# Patient Record
Sex: FEMALE | Race: WHITE | Employment: FULL TIME | URBAN - METROPOLITAN AREA
[De-identification: names, ages, dates, MRNs, and addresses within clinical notes are randomized per-mention and may not be internally consistent; named-entity substitution may affect disease eponyms.]

---

## 2020-01-20 ENCOUNTER — HOSPITAL ENCOUNTER (OUTPATIENT)
Dept: PHYSICAL THERAPY | Age: 65
Discharge: HOME | End: 2020-01-20
Payer: COMMERCIAL

## 2020-01-20 PROCEDURE — 97165 OT EVAL LOW COMPLEX 30 MIN: CPT

## 2020-01-20 PROCEDURE — 97140 MANUAL THERAPY 1/> REGIONS: CPT

## 2020-01-20 NOTE — THERAPY EVALUATION
Shena Christopher  : 1955  Primary: [address] [employer]*  Secondary:  [address] at [facility]  [address], [place], [address]  [phone]   [phone]              OUTPATIENT OCCUPATIONAL THERAPY: Initial Assessment and Daily Note 2020    ICD-10: Treatment Diagnosis: I89.0 lymphedema, not elsewhere specified                                                      I89.022 lymphedema with obesity  Precautions/Allergies:   Patient has no allergy information on record. Fall Risk Score:    Ambulatory/Rehab Services H2 Model Falls Risk Assessment    Risk Factors:       No Risk Factors Identified Ability to Rise from Chair:       (0)  Ability to rise in a single movement    Falls Prevention Plan:       No modifications necessary   Total: (5 or greater = High Risk): 0     [facility] of [employer]. All Rights Reserved. [facility] [employer] Patent #[id number]. Federal Law prohibits the replication, distribution or use without written permission from [facility] [employer]     MD Orders: eval and treat MEDICAL/REFERRING DIAGNOSIS:   Lymphedema, not elsewhere classified [I89.0]   DATE OF ONSET: longstanding   REFERRING PHYSICIAN: Christopher Em*  RETURN PHYSICIAN APPOINTMENT: to be determined      INITIAL ASSESSMENT:  Ms. Yris Alonso was referred to occupational therapy for lymphedema treatment of the BLE's. 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 In 2016 she had gastric bypass surgery and has successfully lost 90 pounds. She continues to slowly lose weight. She is here today to update her HEP for self management of lymphedema and fitting for new compression garments. Patient presents today with  tissue that is soft and pitting in BUE\"s and BLE's - patient's body type is indicative of lipedema with large upper arms, large hips/small ankles and shelf buttocks included.  To date she is not wearing compression garments or self managing her lymphedema through exercise or compression. She will benefit from lymphedema treatment to be fitted for UE/LE compression garments and establishing a home program for self management of lymphedema. PLAN OF CARE:   PROBLEM LIST:  1. Edema/Girth  2. Decreased [place] with Home Exercise Program  3. limb heaviness INTERVENTIONS PLANNED  1. Skin care  2. Compression bandaging prn  3. Fitting for compression garment(s)  4. Manual therapy/Manual lymph drainage  5. Therapeutic exercise/Therapeutic activities  6. Patient Education  7. Compression pump trial prn  8.  kinesiotaping prn   TREATMENT PLAN:  Effective Dates: 1/20/2020 TO 4/19/2020. Frequency/Duration: 1 time a week for 90 days and upon reassessment will adjust frequency and duration as progress indicates. GOALS: (Goals have been discussed and agreed upon with patient.)  Short-Term Functional Goals: Time Frame: 45 days  1. The patient/caregiver will verbalize understanding of lymphedema precautions. 2. Patient will be independent with skin care regimen to decrease risk of cellulitis. 3. The patient/caregiver will be independent with self-manual lymph drainage techniques and show decrease in limb volume. 5. Patient will be independent in lymphatic exercises. Discharge Goals: Time Frame: 90 days  1. Patient's bilateral upper/lower extremity circumferential measurements will decrease on volumetric graph by 3-5cm to maximize functional use in ADL's.    2. The patient/caregiver will be independent with home management of lymphedema. 3. Patient/caregiver will be independent donning and doffing bilateral upper/lower extremity compression garment. Rehabilitation Potential For Stated Goals: Excellent  Regarding Dawna Hammond's therapy, I certify that the treatment plan above will be carried out by a therapist or under their direction.   Thank you for this referral,  Fernando Leal, OT Referring Physician Signature: Jeimy Em* _________________________  Date _________            The information in this section was collected on 1/20/2020 (except where otherwise noted). OCCUPATIONAL PROFILE & HISTORY:   History of Present Injury/Illness (Reason for Referral):  Patient was referred to occupational therapy for lymphedema treatment of the BLE's. 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 In 2016 she had gastric bypass surgery and has successfully lost 90 pounds. She continues to slowly lose weight. She is here today to update her HEP for self management of lymphedema and fitting for new compression garments. Past Medical History/Comorbidities:   Lymphedema, High cholesterol, gastric bypass 2016, gall bladder removal 2006  Social History/Living Environment:    Patient lives in a 2 story home. Her parents live with patient as she is their caretaker. Prior Level of Function/Work/Activity:   at [employer]  Dominant Side:         RIGHT  Previous Treatment Approaches:          Lymphedema treatment at this facility in 2016. Patient had gastric bypass surgery in 2016 and has lost 90 lbs since her surgery. She continues to lose weight slowly. Current Medications:  Statins 40mg           Date Last Reviewed:  1/20/2020   Complexity Level : Brief history (0):  LOW COMPLEXITY   ASSESSMENT OF OCCUPATIONAL PERFORMANCE:   Palpation:          tissue is soft and pitting in BUE\"s and BLE's - patient's body type is indicative of lipedema with large upper arms, large hips/small ankles and shelf buttocks included. ROM:          WFL BUE's/BLE's    Skin Integrity:          Intact but patient has excess skin at abdomen, upper arms and LE's secondary to gastric bypass surgery (lost over 90lbs).   Multi varicosities are noted on BLE's  Sensation:  intact  Functional Mobility:  independent  Activities of Daily Living: independent Edema/Girth:  pitting   PRETREATMENT AFFECTED LIMB(s): right lower extremity  left lower extremity      Date:  1/20/2020         Right / Left           Axilla   [x]      [x] 39/39cm          3 inches   [x]      [x] 39/39cm          Elbow   [x]      [x] 28/26cm          6 inches   [x]      [x] 27/27cm          3 inches   [x]      [x] 20/20cm          Wrist   [x]      [x] 16/16cm          Palm   []      []         Total limb size   [x]      [x] 169/167cm        Measurements are taken in centimeters:  2.54 cm = 1 inch         PRETREATMENT AFFECTED LIMB(s): right lower extremity  left lower extremity      Date:  1/20/2020         Right / Left           Groin   []      []           8 inches   [x]      [x] 67/68cm          4 inches   [x]      [x] 60/55cm        PoplitealSpace   []      []           8 inches   [x]      [x] 51/52cm          4 inches   []      []           Ankle   [x]      [x] 26/27cm          Instep   []      []         Measurements are taken in centimeters:  2.54 cm = 1 inch    RLE total limb size 204xm        LLE total limb size 204cm               Physical Skills Involved:  1. Edema  2. limb heaviness Cognitive Skills Affected (resulting in the inability to perform in a timely and safe manner):  1. Psychosocial Skills Affected:  1. Habits/Routines   Number of elements that affect the Plan of Care: 1-3:  LOW COMPLEXITY   CLINICAL DECISION MAKING:   Outcome Measure: Tool Used: Tool Used: Lymphedema Life Impact Scale   Score:  Initial: 9 Most Recent: X (Date: -- )   Interpretation of Score: The Lymphedema Life Impact Scale (LLIS) is a validated instrument that measures the physical, functional, and psychosocial concerns pertinent to patients with extremity lymphedema. The Scale's questionnaire is administered to patients to gauge impairments, activity limitations, and participation restrictions resulting from their lymphedema.   Score 0 1-13 14-26 27-40 41-54 55-67 68   Modifier CH CI CJ CK CL CM CN ? Other PT/OT Primary Functional Limitations:     - CURRENT STATUS: CI - 1%-19% impaired, limited or restricted    - GOAL STATUS: CI - 1%-19% impaired, limited or restricted    - D/C STATUS:  ---------------To be determined---------------       Medical Necessity:   · Skilled intervention continues to be required due to BLE lymphedema. Reason for Services/Other Comments:  · Patient continues to require skilled intervention due to patient's inability to effectively self manage BUE/BLE lymphedema. Use of outcome tool(s) and clinical judgement create a POC that gives a: Clear prediction of patient's progress: LOW COMPLEXITY   TREATMENT:   (In addition to Assessment/Re-Assessment sessions the following treatments were rendered)    Pre-treatment Symptoms/Complaints:  BUE/BLE lymphedema  Pain: Initial:   Pain Intensity 1: 0  Post Session:  0/10   Occupational Therapy Treatments:    OT eval( x ) OT eval was completed on 1/20/2020. Pt received information on lymphedema and risk reduction/self management practices as outlined by the National Lymphedema Network. Therapeutic Exercise ( minutes):     HEP:  As above; handouts given to patient for all exercises. Manual Therapy: (45 minutes): patient education for pathophysiology of the lymphatic system and review of self management principles for lipo-lymphedema. At present patient is not wearing any compression and reports her LE's increase in size as the day progresses - this does fluctuate though. Since her bypass surgery she has excess loose skin that can contribute to her fluctuations in limb size. Self management principles for lymphedema were reviewed to include: daily skin care, self MLD and exercise to aid in promoting lymphatic flow. Patient will consider joining the gym program at this facility to allow her to perform water exercises on a consistent basis. See compression section below.           Manual Lymph Drainage:          Lymph Nodes:    Cervical Supraclavicular Axillary Abdominal Inguinal Popliteal Antecubital   RIGHT []     []     []     []     []     []     []       LEFT []     []     []     []     []     []     []          Anastamoses:   Axillo-axillary Inguino-inguinal Axillo-inguinal Inguino-axillary   ANTERIOR []     []     []     []       POSTERIOR []     []     []     []       RIGHT []     []     []     []       LEFT []     []     []     []         Limbs:   []    RUE     []    LUE     []    RLE    []    LLE   Compression: Patient was measured today for a Solidea bilateral arm sleeve (medium, cream) and 15-20mmHg thigh high compression stockings (Juzo soft, sive 4, short, open toe, black). Order was placed through [facility] and will be shipped directly to patient's home. She will wear for day compression once garments are received. Treatment/Session Assessment:    · Response to Treatment:  Patient tolerated assessment/treatment without complication. She agrees with treatment plan established. Once she receives her garments she will return to therapy to update her HEP for lymphedema. · Compliance with Program/Exercises: Will assess as treatment progresses. · Recommendations/Intent for next treatment session: \"Next visit will focus on lymphedema treatment protocol and patient education for self management principles for lymphedema. \".   Total Treatment Duration:  OT Patient Time In/Time Out  Time In: 1100  Time Out: [address], OT

## 2020-05-18 NOTE — THERAPY DISCHARGE
Sherren Arthur  : 1955  Primary: [place]  Secondary:  [facility] at [facility]  [address], [facility] [address]  [phone]   [phone]              OUTPATIENT OCCUPATIONAL THERAPY:Discontinuation Note 2020    ICD-10: Treatment Diagnosis: I89.0 lymphedema, not elsewhere specified                                                      I89.022 lymphedema with obesity  Precautions/Allergies:   Patient has no allergy information on record. Fall Risk Score:    Ambulatory/Rehab Services H2 Model Falls Risk Assessment    Risk Factors:       No Risk Factors Identified Ability to Rise from Chair:       (0)  Ability to rise in a single movement    Falls Prevention Plan:       No modifications necessary   Total: (5 or greater = High Risk): 0     [facility] of [facility]. All Rights Reserved. [facility] Patent #[id number]. Federal Law prohibits the replication, distribution or use without written permission from [facility] of [address] [place] [address]     MD Orders: eval and treat MEDICAL/REFERRING DIAGNOSIS:   Lymphedema, not elsewhere classified [I89.0]   DATE OF ONSET: longstanding   REFERRING PHYSICIAN: Mikhail Em*  RETURN PHYSICIAN APPOINTMENT: to be determined      INITIAL ASSESSMENT:  Ms. Jesse Epps was referred to occupational therapy for lymphedema treatment of the BLE's. 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 In 2016 she had gastric bypass surgery and has successfully lost 90 pounds. She continues to slowly lose weight. She is here today to update her HEP for self management of lymphedema and fitting for new compression garments. Patient presents today with  tissue that is soft and pitting in BUE\"s and BLE's - patient's body type is indicative of lipedema with large upper arms, large hips/small ankles and shelf buttocks included.  To date she is not wearing compression garments or self managing her lymphedema through exercise or compression. She will benefit from lymphedema treatment to be fitted for UE/LE compression garments and establishing a home program for self management of lymphedema. DISCONTINUATION (5/18/2020): Patient is discontinued from therapy. She did not return after her evaluation. PLAN OF CARE:   PROBLEM LIST:  1. Edema/Girth  2. Decreased [place] with Home Exercise Program  3. limb heaviness INTERVENTIONS PLANNED  1. Skin care  2. Compression bandaging prn  3. Fitting for compression garment(s)  4. Manual therapy/Manual lymph drainage  5. Therapeutic exercise/Therapeutic activities  6. Patient Education  7. Compression pump trial prn  8.  kinesiotaping prn   TREATMENT PLAN:  Effective Dates: 1/20/2020 TO 4/19/2020. Frequency/Duration: 1 time a week for 90 days and upon reassessment will adjust frequency and duration as progress indicates. GOALS: (Goals have been discussed and agreed upon with patient.)  Short-Term Functional Goals: Time Frame: 45 days Goals not met  1. The patient/caregiver will verbalize understanding of lymphedema precautions. 2. Patient will be independent with skin care regimen to decrease risk of cellulitis. 3. The patient/caregiver will be independent with self-manual lymph drainage techniques and show decrease in limb volume. 5. Patient will be independent in lymphatic exercises. Discharge Goals: Time Frame: 90 days Goals not met  1. Patient's bilateral upper/lower extremity circumferential measurements will decrease on volumetric graph by 3-5cm to maximize functional use in ADL's.    2. The patient/caregiver will be independent with home management of lymphedema. 3. Patient/caregiver will be independent donning and doffing bilateral upper/lower extremity compression garment.     Rehabilitation Potential For Stated Goals: Excellent  Regarding Jane Hammond's therapy, I certify that the treatment plan above will be carried out by a therapist or under their direction. Thank you for this referral,  Jame Brown, OT     Referring Physician Signature: Ptarice Em* _________________________  Date _________            The information in this section was collected on 1/20/2020 (except where otherwise noted).

## 2022-06-16 ENCOUNTER — OFFICE VISIT (OUTPATIENT)
Dept: ORTHOPEDIC SURGERY | Age: 67
End: 2022-06-16
Payer: MEDICARE

## 2022-06-16 VITALS — BODY MASS INDEX: 39.64 KG/M2 | HEIGHT: 64 IN | WEIGHT: 232.2 LBS

## 2022-06-16 DIAGNOSIS — M25.561 RIGHT KNEE PAIN, UNSPECIFIED CHRONICITY: Primary | ICD-10-CM

## 2022-06-16 DIAGNOSIS — M17.0 PRIMARY OSTEOARTHRITIS OF BOTH KNEES: ICD-10-CM

## 2022-06-16 PROCEDURE — G8417 CALC BMI ABV UP PARAM F/U: HCPCS | Performed by: ORTHOPAEDIC SURGERY

## 2022-06-16 PROCEDURE — G8400 PT W/DXA NO RESULTS DOC: HCPCS | Performed by: ORTHOPAEDIC SURGERY

## 2022-06-16 PROCEDURE — 4004F PT TOBACCO SCREEN RCVD TLK: CPT | Performed by: ORTHOPAEDIC SURGERY

## 2022-06-16 PROCEDURE — 1090F PRES/ABSN URINE INCON ASSESS: CPT | Performed by: ORTHOPAEDIC SURGERY

## 2022-06-16 PROCEDURE — 3017F COLORECTAL CA SCREEN DOC REV: CPT | Performed by: ORTHOPAEDIC SURGERY

## 2022-06-16 PROCEDURE — G8427 DOCREV CUR MEDS BY ELIG CLIN: HCPCS | Performed by: ORTHOPAEDIC SURGERY

## 2022-06-16 PROCEDURE — 20610 DRAIN/INJ JOINT/BURSA W/O US: CPT | Performed by: ORTHOPAEDIC SURGERY

## 2022-06-16 PROCEDURE — 1123F ACP DISCUSS/DSCN MKR DOCD: CPT | Performed by: ORTHOPAEDIC SURGERY

## 2022-06-16 PROCEDURE — 99204 OFFICE O/P NEW MOD 45 MIN: CPT | Performed by: ORTHOPAEDIC SURGERY

## 2022-06-16 RX ORDER — LISINOPRIL AND HYDROCHLOROTHIAZIDE 12.5; 1 MG/1; MG/1
TABLET ORAL
COMMUNITY
Start: 2022-05-03

## 2022-06-16 RX ORDER — ROSUVASTATIN CALCIUM 10 MG/1
TABLET, COATED ORAL
COMMUNITY
Start: 2022-05-03

## 2022-06-16 RX ORDER — METHYLPREDNISOLONE ACETATE 40 MG/ML
40 INJECTION, SUSPENSION INTRA-ARTICULAR; INTRALESIONAL; INTRAMUSCULAR; SOFT TISSUE ONCE
Status: COMPLETED | OUTPATIENT
Start: 2022-06-16 | End: 2022-06-16

## 2022-06-16 RX ADMIN — METHYLPREDNISOLONE ACETATE 40 MG: 40 INJECTION, SUSPENSION INTRA-ARTICULAR; INTRALESIONAL; INTRAMUSCULAR; SOFT TISSUE at 15:19

## 2022-06-16 NOTE — PROGRESS NOTES
Patient ID:  Rico Sidhu  [id number]  22 y.o.  1955    Today: June 16, 2022          Chief Complaint:  Bilateral Knee pain    HPI:       Rico Sidhu is a 78 y.o. female  that presents today for evaluation and treatment of bilateral knee pain. Patient reports onset of pain was some time ago and has recently become more significant. The patient complains of knee pain with activities, reports stiffness of the knee with prolonged inactivity, and swelling/pain at the end of the day and after increased physical activity. Pain is described as a general ache with occasional sharp pain, primarily along the joint lines. The patient reports pain ranging from 3-8 on the pain scale which seems to fluctuate depending on the day/activity. Generally, symptoms improve with sitting/rest.  The pain affects the patients activities of daily living and quality of life. Patient reports progressive pain and instability in the knee. Patient has attempted prior conservative treatment including Over-the-Counter medications including NSAID and/or Tylenol and Activity Modifications    Past Medical History:  No past medical history on file. Past Surgical History:  No past surgical history on file. Medications:     Prior to Admission medications    Medication Sig Start Date End Date Taking? Authorizing Provider   lisinopril-hydroCHLOROthiazide (PRINZIDE;ZESTORETIC) 10-12.5 MG per tablet  5/3/22   Historical Provider, MD   rosuvastatin (CRESTOR) 10 MG tablet  5/3/22   Historical Provider, MD       Family History:   No family history on file. Social History:      Social History     Tobacco Use    Smoking status: Not on file    Smokeless tobacco: Not on file   Substance Use Topics    Alcohol use: Not on file         Allergies:       Allergies   Allergen Reactions    Codeine      Other reaction(s): Nausea and/or vomiting-Intolerance  Passing out, tramadol ok        Vitals:   Ht 5' 4\" (1.626 m)   Wt 232 lb 3.2 oz (105.3 kg)   BMI 39.86 kg/m²     ROS:    ROS has been reviewed and pertinent positives and negative pertaining to orthopaedic complaints are noted. All non-orthopaedic issues are deferred to PCP      Objective:     Vitals:   Ht 5' 4\" (1.626 m)   Wt 232 lb 3.2 oz (105.3 kg)   BMI 39.86 kg/m²     General: Awake and alert. AAOx3. Psych: Mood appropriate  HEENT: Normocephalic, atraumatic  Neck: Supple  CV/Pulm: Breathing even and unlabored  Abdomen: Nondistended  Circulation: Normal without obvious arterial or venous deficiency. Pulses palpable bilateral lower extremities. Lymphatic: No obvious lymphedema or lymphadenopathy noted. Skin: No obvious lacerations or rashes noted. Musculoskeletal: No obvious deformity or pain with active movement of the upper extremities. Neuro: No obvious deficiency or weakness noted of the upper or lower extremities    Knee Exam:  There is pain with ROM of both knees. Range of motion on the right is 0-130. Range of motion on the left is 0-130. Trace effusion noted in the knees. Patellofemoral crepitus is present. Tenderness along the joint line both at rest and during motion. There is no significant ligamentous laxity. Distally the patient shows no neurologic deficit. Sensation is grossly intact. The patient is able to grossly plantar- and dorsi-flex the involved foot/ankle. Imaging (obtained today or previously):    Heading: XR Knee 3-4 View  Views: AP knee, skiers PA knee, lateral knee, sunrise view bilateral knee  Clinical indication: Bilateral Knee Pain   Findings: Xrays of the knees obtained today or previously show tricompartmental bone-on-bone arthritic changes with associated osteophyte formation and subchondral sclerosis. Impression: Bilateral Knee osteoarthritis    Sasha Chairez MD    Assessment:   1. Bilateral Knee Arthritis    Plan:  Treatment option have been discussed with the patient.  The patient understands the nature of knee arthritis and that this is generally a progressive condition. We discussed oral anti-inflammatory medications, activity modifications, physical therapy, corticosteroid injections, weight loss (if appropriate), and surgery. We discussed that given the degenerative nature of the joint that in most cases surgery is the definitive treatment for this condition. We did discuss some of the details of surgery along with some of the risks, benefits and alternatives. At this point the patient is a candidate for surgery however they would like to try to postpone surgery at this point in time if possible. At this point the patient has failed the aforementioned treatment modalities and would like to proceed with Corticosteroid Injection    Treatment:    Steroid Injection - Risks, benefits, and alternatives of corticosteroid injection were discussed. After any questions were address the patient wished to proceed with injection. After prepping the injection site each knee was injected with 1cc of 40mg Depomedrol/3cc marcaine. Patient tolerated the procedure well. Patient is instructed to ice the joint for next few days if they experience discomfort.  The patient will followup as directed or as needed         Signed By: Bhargav Rao MD  June 16, 2022

## 2022-08-31 ENCOUNTER — TELEPHONE (OUTPATIENT)
Dept: ORTHOPEDIC SURGERY | Age: 67
End: 2022-08-31

## 2022-08-31 NOTE — TELEPHONE ENCOUNTER
Pt called and wants to have a left knee replacement. She wants to see Dr. Srini Ruvalcaba but saw Dr. Piedad Cortes for one appointment in June. She has friends that told her to see Dr. Srini Ruvalcaba. Will he see her?

## 2022-09-08 ENCOUNTER — OFFICE VISIT (OUTPATIENT)
Dept: ORTHOPEDIC SURGERY | Age: 67
End: 2022-09-08
Payer: MEDICARE

## 2022-09-08 DIAGNOSIS — M17.0 PRIMARY OSTEOARTHRITIS OF BOTH KNEES: Primary | ICD-10-CM

## 2022-09-08 PROCEDURE — 1090F PRES/ABSN URINE INCON ASSESS: CPT | Performed by: ORTHOPAEDIC SURGERY

## 2022-09-08 PROCEDURE — 20610 DRAIN/INJ JOINT/BURSA W/O US: CPT | Performed by: ORTHOPAEDIC SURGERY

## 2022-09-08 PROCEDURE — 99214 OFFICE O/P EST MOD 30 MIN: CPT | Performed by: ORTHOPAEDIC SURGERY

## 2022-09-08 PROCEDURE — 4004F PT TOBACCO SCREEN RCVD TLK: CPT | Performed by: ORTHOPAEDIC SURGERY

## 2022-09-08 PROCEDURE — G8400 PT W/DXA NO RESULTS DOC: HCPCS | Performed by: ORTHOPAEDIC SURGERY

## 2022-09-08 PROCEDURE — G8428 CUR MEDS NOT DOCUMENT: HCPCS | Performed by: ORTHOPAEDIC SURGERY

## 2022-09-08 PROCEDURE — G8417 CALC BMI ABV UP PARAM F/U: HCPCS | Performed by: ORTHOPAEDIC SURGERY

## 2022-09-08 PROCEDURE — 1123F ACP DISCUSS/DSCN MKR DOCD: CPT | Performed by: ORTHOPAEDIC SURGERY

## 2022-09-08 PROCEDURE — 3017F COLORECTAL CA SCREEN DOC REV: CPT | Performed by: ORTHOPAEDIC SURGERY

## 2022-09-08 RX ORDER — METHYLPREDNISOLONE ACETATE 40 MG/ML
40 INJECTION, SUSPENSION INTRA-ARTICULAR; INTRALESIONAL; INTRAMUSCULAR; SOFT TISSUE ONCE
Qty: 1 ML | Refills: 0
Start: 2022-09-08 | End: 2022-09-08

## 2022-09-08 NOTE — PROGRESS NOTES
Patient ID:  Carletta Kayser  [id number]  1 y.o.  1955    Today: September 8, 2022          Chief Complaint: Bilateral Knee pain    HPI:       Carletta Kayser is a 78 y.o. female seen for evaluation and treatment of their knee pain. Patient reports a longstanding history of pain involving the knee. The patient complains of knee pain with activities, reports pain as mostly occurring along the joint lines, reports stiffness of the knee with prolonged inactivity, and swelling/pain at the end of the day and after increased physical activity. The patient's knee pain cause moderate to severe limitations in the activities of rising from bed, putting on socks/shoes, rising from a sitting position, bending towards the floor and kneeling, twisting and pivoting on the limb and squatting. In addition, at times the patient reports extreme difficulty with these activities. Generally, symptoms improve with sitting/rest. The pain affects the patients activities of daily living and quality of life. Patient reports progressive pain and instability in the knee. The pain has been present for an extended period of time. Pain ranges from approximately 4-8 in a cyclical fashion with periods of acute exacerbation. Prior procedures on the knee include reconstruction. Patient has attempted prior conservative treatment including medications and activity modification. Treatment to date has included oral medications inclusive of over-the-counter medications (NSAIDS and/or Tylenol) +/-prescription medications, activity modification, weight loss (if applicable), and injections which include corticosteroids and/or viscosupplimentation. Past Medical History:  No past medical history on file. Past Surgical History:  No past surgical history on file. Medications:     Prior to Admission medications    Medication Sig Start Date End Date Taking?  Authorizing Provider   lisinopril-hydroCHLOROthiazide (PRINZIDE;ZESTORETIC) 10-12.5 MG per tablet  5/3/22   Historical Provider, MD   rosuvastatin (CRESTOR) 10 MG tablet  5/3/22   Historical Provider, MD       Family History:   No family history on file. Social History:      Social History     Tobacco Use    Smoking status: Not on file    Smokeless tobacco: Not on file   Substance Use Topics    Alcohol use: Not on file         Allergies: Allergies   Allergen Reactions    Codeine      Other reaction(s): Nausea and/or vomiting-Intolerance  Passing out, tramadol ok        Vitals: There were no vitals taken for this visit. ROS:   Review of Systems         Objective:   General: Patient is awake and in no acute distress  Psych: Mood and affect appropriate  HEENT: Normocephalic. Atramatic. Pupils equal, round and reactive. Sclera normal.   Neck: Supple without obvious mass   Chest: Symmetric  Lungs:  Breathing non-labored. No tachypnea noted. Abdomen: Soft on gross examination without obvious distention. Neuro: No obvious neurologic deficit. Grossly moves bilateral upper extremities without motor or sensory deficits. No gross weakness noted in the lower extremities. No hyporeflexia or hyperreflexia noted. Vascular: No gross arterial or venous deficiency noted. DP and PT pulses are palpable in the lower extremities  Lymphatic: No lymphedema noted in the lower extremities. Skin: No prior incisions noted about the right knee. No obvious rashes noted about the area. No skin changes noted about the knee or about the adjacent thigh or leg. Extremities:  Patient ambulates with an antalgic gait. There is pain with ROM of the right knee. Range of motion is 0-120. Trace effusion noted in the knee. Patellofemoral crepitus is present. Distally the patient shows no neurologic deficit.         Xrays (obtained either today or previously)    Heading: Knee Xrays  Views: AP knee, skiers PA, lateral knee, sunrise view bilateral knee  Clinical indication: Bilateral Knee Pain Findings: Xrays of the knees obtained today or previously show tricompartmental bone-on-bone arthritic changes of the bilateral knee with associated osteophyte formation and subchondral sclerosis. Impression: Bilateral Knee osteoarthritis    Luzma Johns MD      Assessment:   1. Arthritis of the Bilateral knee    Plan:   Differential diagnosis and treatment options have been discussed with the patient. Risks, benefits and alternatives of each were discussed and patient questions answered. The patient understands the nature of knee arthritis and that this is generally a progressive condition. At this point the patient has failed the aforementioned treatment modalities. At this point the patient would like to proceed with Total Knee Replacement. TREATMENT:    Total Knee Replacement- The patient has failed previous conservative treatment for this condition including anti-inflammatories, injections and lifestyle modifications. The necessity for joint replacement is present. Risks, benefits, alternatives and possible complications of left knee arthroplasty were discussed with the patient including but not limited to potential for infection, bleeding, damage to nerves and/or blood vessels, stiffness of the knee after surgery, knee instability after surgery, patellar maltracking, potential for fracture both intra-op and post-op, polyethylene wear, implant loosening, and risk for revision surgery secondary to but not limited to these discussed risks. Further, we have discussed potential for venous thrombo-embolism including deep vein thrombosis and pulmonary embolism despite being on prophylaxis. Additionally, we have discussed potential for continued pain after surgery and patient has voiced understanding that I can make no guarantees regarding the pain relief of outcomes after surgery.  We have also previously discussed the potential of morbidity and mortality associated with, but not limited to, the actual surgical procedure, anesthesia, prior medical conditions, and/or the administration of medications perioperatively. The patient has been given the opportunity to ask questions all of which have been answered and the patient wishes to proceed. Also patient would like to proceed with knee injection today. Risks, benefits, and alternatives of corticosteroid injection were discussed. We discussed potential risks of steroid injection including but not limited to steroid flare with an associated increase in pain, fat necrosis, skin discoloration around the injection site, temporary increase in blood sugars in diabetic patients and possible facial flushing after injection. After any questions were address the patient wished to proceed with injection. After prepping the injection site and bilateral knee was injected with 1cc Depomedrol/3cc marcaine. Patient tolerated the procedure well. Patient is instructed to ice the joint for next few days if they experience discomfort.           Signed By: Ava Cervantes MD  September 8, 2022

## 2022-09-23 DIAGNOSIS — M17.12 PRIMARY OSTEOARTHRITIS OF LEFT KNEE: Primary | ICD-10-CM

## 2022-09-23 DIAGNOSIS — M21.962 ACQUIRED DEFORMITY OF LEFT KNEE: ICD-10-CM

## 2022-10-04 DIAGNOSIS — M21.962 ACQUIRED DEFORMITY OF LEFT KNEE: ICD-10-CM

## 2022-10-04 DIAGNOSIS — M17.12 PRIMARY OSTEOARTHRITIS OF LEFT KNEE: ICD-10-CM

## 2022-11-21 ENCOUNTER — PREP FOR PROCEDURE (OUTPATIENT)
Dept: ORTHOPEDIC SURGERY | Age: 67
End: 2022-11-21

## 2022-11-21 DIAGNOSIS — M17.12 PRIMARY OSTEOARTHRITIS OF LEFT KNEE: Primary | ICD-10-CM

## 2022-11-21 RX ORDER — SODIUM CHLORIDE 9 MG/ML
INJECTION, SOLUTION INTRAVENOUS PRN
OUTPATIENT
Start: 2022-11-21

## 2022-11-21 RX ORDER — SODIUM CHLORIDE 0.9 % (FLUSH) 0.9 %
5-40 SYRINGE (ML) INJECTION EVERY 12 HOURS SCHEDULED
OUTPATIENT
Start: 2022-11-21

## 2022-11-21 RX ORDER — SODIUM CHLORIDE 0.9 % (FLUSH) 0.9 %
5-40 SYRINGE (ML) INJECTION PRN
OUTPATIENT
Start: 2022-11-21

## 2022-11-23 ENCOUNTER — TELEPHONE (OUTPATIENT)
Dept: ORTHOPEDIC SURGERY | Age: 67
End: 2022-11-23

## 2022-11-23 NOTE — TELEPHONE ENCOUNTER
I called and spoke to patient. She called about receiving an email on 11/21 from Immanuel Avendano about prep for procedure. I told her that is just ashish putting information in for her joint camp and not an appointment that she should've attended on 11/21.

## 2022-11-28 ENCOUNTER — HOSPITAL ENCOUNTER (OUTPATIENT)
Dept: REHABILITATION | Age: 67
Discharge: HOME | End: 2022-12-01
Payer: MEDICARE

## 2022-11-28 ENCOUNTER — HOSPITAL ENCOUNTER (OUTPATIENT)
Dept: SURGERY | Age: 67
Discharge: HOME | End: 2022-12-01
Payer: MEDICARE

## 2022-11-28 VITALS
HEIGHT: 64 IN | RESPIRATION RATE: 18 BRPM | OXYGEN SATURATION: 97 % | WEIGHT: 224 LBS | HEART RATE: 83 BPM | TEMPERATURE: 98 F | DIASTOLIC BLOOD PRESSURE: 69 MMHG | SYSTOLIC BLOOD PRESSURE: 155 MMHG | BODY MASS INDEX: 38.24 KG/M2

## 2022-11-28 DIAGNOSIS — M17.12 PRIMARY OSTEOARTHRITIS OF LEFT KNEE: ICD-10-CM

## 2022-11-28 DIAGNOSIS — Z91.14 NONCOMPLIANCE WITH CPAP TREATMENT: ICD-10-CM

## 2022-11-28 LAB
ALBUMIN SERPL-MCNC: 3.7 G/DL (ref 3.2–4.6)
ANION GAP SERPL CALC-SCNC: 5 MMOL/L (ref 2–11)
APTT PPP: 32 SEC (ref 24.5–34.2)
BACTERIA SPEC CULT: NORMAL
BASOPHILS # BLD: 0 K/UL (ref 0–0.2)
BASOPHILS NFR BLD: 1 % (ref 0–2)
BUN SERPL-MCNC: 17 MG/DL (ref 8–23)
CALCIUM SERPL-MCNC: 10.1 MG/DL (ref 8.3–10.4)
CHLORIDE SERPL-SCNC: 103 MMOL/L (ref 101–110)
CO2 SERPL-SCNC: 32 MMOL/L (ref 21–32)
CREAT SERPL-MCNC: 0.68 MG/DL (ref 0.6–1)
DIFFERENTIAL METHOD BLD: ABNORMAL
EKG ATRIAL RATE: 85 BPM
EKG DIAGNOSIS: NORMAL
EKG P AXIS: 54 DEGREES
EKG P-R INTERVAL: 160 MS
EKG Q-T INTERVAL: 352 MS
EKG QRS DURATION: 74 MS
EKG QTC CALCULATION (BAZETT): 418 MS
EKG R AXIS: 63 DEGREES
EKG T AXIS: 61 DEGREES
EKG VENTRICULAR RATE: 85 BPM
EOSINOPHIL # BLD: 0.2 K/UL (ref 0–0.8)
EOSINOPHIL NFR BLD: 4 % (ref 0.5–7.8)
ERYTHROCYTE [DISTWIDTH] IN BLOOD BY AUTOMATED COUNT: 14.5 % (ref 11.9–14.6)
EST. AVERAGE GLUCOSE BLD GHB EST-MCNC: 117 MG/DL
GLUCOSE SERPL-MCNC: 95 MG/DL (ref 65–100)
HBA1C MFR BLD: 5.7 % (ref 4.8–5.6)
HCT VFR BLD AUTO: 46 % (ref 35.8–46.3)
HGB BLD-MCNC: 14.4 G/DL (ref 11.7–15.4)
IMM GRANULOCYTES # BLD AUTO: 0 K/UL (ref 0–0.5)
IMM GRANULOCYTES NFR BLD AUTO: 0 % (ref 0–5)
INR PPP: 1
LYMPHOCYTES # BLD: 1.7 K/UL (ref 0.5–4.6)
LYMPHOCYTES NFR BLD: 25 % (ref 13–44)
MCH RBC QN AUTO: 29.5 PG (ref 26.1–32.9)
MCHC RBC AUTO-ENTMCNC: 31.3 G/DL (ref 31.4–35)
MCV RBC AUTO: 94.3 FL (ref 82–102)
MONOCYTES # BLD: 0.4 K/UL (ref 0.1–1.3)
MONOCYTES NFR BLD: 7 % (ref 4–12)
NEUTS SEG # BLD: 4.1 K/UL (ref 1.7–8.2)
NEUTS SEG NFR BLD: 64 % (ref 43–78)
NRBC # BLD: 0 K/UL (ref 0–0.2)
PLATELET # BLD AUTO: 179 K/UL (ref 150–450)
PMV BLD AUTO: 10.8 FL (ref 9.4–12.3)
POTASSIUM SERPL-SCNC: 4.3 MMOL/L (ref 3.5–5.1)
PROTHROMBIN TIME: 13.6 SEC (ref 12.6–14.3)
RBC # BLD AUTO: 4.88 M/UL (ref 4.05–5.2)
SERVICE CMNT-IMP: NORMAL
SODIUM SERPL-SCNC: 140 MMOL/L (ref 133–143)
WBC # BLD AUTO: 6.5 K/UL (ref 4.3–11.1)

## 2022-11-28 PROCEDURE — 97161 PT EVAL LOW COMPLEX 20 MIN: CPT

## 2022-11-28 PROCEDURE — 85025 COMPLETE CBC W/AUTO DIFF WBC: CPT

## 2022-11-28 PROCEDURE — 83036 HEMOGLOBIN GLYCOSYLATED A1C: CPT

## 2022-11-28 PROCEDURE — 87641 MR-STAPH DNA AMP PROBE: CPT

## 2022-11-28 PROCEDURE — 82040 ASSAY OF SERUM ALBUMIN: CPT

## 2022-11-28 PROCEDURE — 80307 DRUG TEST PRSMV CHEM ANLYZR: CPT

## 2022-11-28 PROCEDURE — 94760 N-INVAS EAR/PLS OXIMETRY 1: CPT

## 2022-11-28 PROCEDURE — 85610 PROTHROMBIN TIME: CPT

## 2022-11-28 PROCEDURE — 93005 ELECTROCARDIOGRAM TRACING: CPT

## 2022-11-28 PROCEDURE — 85730 THROMBOPLASTIN TIME PARTIAL: CPT

## 2022-11-28 PROCEDURE — 80048 BASIC METABOLIC PNL TOTAL CA: CPT

## 2022-11-28 ASSESSMENT — PAIN DESCRIPTION - ORIENTATION: ORIENTATION: LEFT;RIGHT

## 2022-11-28 ASSESSMENT — KOOS JR
STRAIGHTENING KNEE FULLY: 2
GOING UP OR DOWN STAIRS: 2
HOW SEVERE IS YOUR KNEE STIFFNESS AFTER FIRST WAKING IN MORNING: 1
BENDING TO THE FLOOR TO PICK UP OBJECT: 1
STANDING UPRIGHT: 2
TWISING OR PIVOTING ON KNEE: 2
KOOS JR TOTAL INTERVAL SCORE: 57.14
RISING FROM SITTING: 2

## 2022-11-28 ASSESSMENT — PULMONARY FUNCTION TESTS
FEV1 (%PREDICTED): 76
FEV1 (LITERS): 1.83

## 2022-11-28 ASSESSMENT — PAIN DESCRIPTION - LOCATION: LOCATION: KNEE

## 2022-11-28 ASSESSMENT — PAIN SCALES - GENERAL: PAINLEVEL_OUTOF10: 7

## 2022-11-28 NOTE — PERIOP NOTE
The below lab results are within anesthesia limits.       Latest Reference Range & Units 11/28/22 12:30   Sodium 133 - 143 mmol/L 140   Potassium 3.5 - 5.1 mmol/L 4.3   Chloride 101 - 110 mmol/L 103   CO2 21 - 32 mmol/L 32   BUN,BUNPL 8 - 23 MG/DL 17   Creatinine 0.6 - 1.0 MG/DL 0.68   Anion Gap 2 - 11 mmol/L 5   Est, Glom Filt Rate >60 ml/min/1.73m2 >60   Glucose, Random 65 - 100 mg/dL 95   CALCIUM, SERUM, 554276 8.3 - 10.4 MG/DL 10.1   Albumin 3.2 - 4.6 g/dL 3.7   Hemoglobin A1C 4.8 - 5.6 % 5.7 (H)   eAG (mg/dL) mg/dL 117   WBC 4.3 - 11.1 K/uL 6.5   RBC 4.05 - 5.2 M/uL 4.88   Hemoglobin Quant 11.7 - 15.4 g/dL 14.4   Hematocrit 35.8 - 46.3 % 46.0   MCV 82.0 - 102.0 FL 94.3   MCH 26.1 - 32.9 PG 29.5   MCHC 31.4 - 35.0 g/dL 31.3 (L)   MPV 9.4 - 12.3 FL 10.8   RDW 11.9 - 14.6 % 14.5   Platelet Count 707 - 450 K/uL 179   Absolute Mono # 0.1 - 1.3 K/UL 0.4   Eosinophils % 0.5 - 7.8 % 4   Basophils Absolute 0.0 - 0.2 K/UL 0.0   Differential Type -   AUTOMATED   Seg Neutrophils 43 - 78 % 64   Segs Absolute 1.7 - 8.2 K/UL 4.1   Lymphocytes 13 - 44 % 25   Absolute Lymph # 0.5 - 4.6 K/UL 1.7   Monocytes 4.0 - 12.0 % 7   Absolute Eos # 0.0 - 0.8 K/UL 0.2   Basophils 0.0 - 2.0 % 1   Immature Granulocytes 0.0 - 5.0 % 0   Nucleated Red Blood Cells 0.0 - 0.2 K/uL 0.00   Absolute Immature Granulocyte 0.0 - 0.5 K/UL 0.0   Prothrombin Time 12.6 - 14.3 sec 13.6   INR -   1.0   PTT 24.5 - 34.2 SEC 32.0   MSSA/MRSA SCREEN BY PCR  Rpt   (H): Data is abnormally high  (L): Data is abnormally low  Rpt: View report in Results Review for more information

## 2022-11-28 NOTE — PROGRESS NOTES
11/28/22 1230   Treatment   Treatment Type Bedside spirometry   Oxygen Therapy/Pulse Ox   O2 Therapy Room air   Heart Rate 83   SpO2 97 %   Pulse Oximeter Device Mode Intermittent   $Pulse Oximeter $Spot check (single)   Bedside Spirometry   FEV-1/Actual (Liters) 1.83 Liters   FEV-1/Predicted (Liters) 76 Liters   Initial respiratory Assessment completed with pt. Pt was interviewed and evaluated in Joint camp prior to surgery. Patient ID:  Rufina Quesada  [id number]  24 y.o.  1955  Surgeon: Dr. Elvia Rodas  Date of Surgery: [email or web address]  Procedure: Total Left Knee Arthroplasty  Primary Care Physician: Yvette Leon [phone]  Specialists:     BS:DIMINISHED      Pt taught proper COUGH technique  IS REVIEWED WITH PT AS WELL AS BENEFITS OF USING IS IN SEDENTARY PTS. DIAPHRAGMATIC BREATHING EXERCISE INSTRUCTIONS GIVEN    History of smoking:   DENIES                 Quit date:         Secondhand smoke:DENIES    Past procedures with Oxygen desaturation or delayed awakening:DENIES    Past Medical History:   Diagnosis Date    High cholesterol     managed with medication    Hypertension     managed with medication    Obesity         Respiratory history:DENIES SOB                                                                   Respiratory meds:  DENIES    FAMILY PRESENT:             NO     PAST SLEEP STUDY:        YES        - COULD NOT RECALL WHERE STUDY WAS DONE            HX OF ARNAUD:                        YES             PT STATES SHE HAD WEIGHT LOSS AND DID NOT NEED CPAP ANYMORE BUT NEVER HAD FOLLOW UP   ARNAUD assessment:    . DANGERS OF UNTREATED ARNAUD EXPLAINED TO PT.                                          SLEEPS ON SIDE        PHYSICAL EXAM   Body mass index is 38.45 kg/m².    Vitals:    11/28/22 1230   BP:    Pulse: (P) 83   Resp:    Temp:    SpO2: (P) 97%     Neck circumference:  36    cm    Loud snoring:                                                      DENIES  Witnessed apnea or wakening gasping or choking:        DENIES         Awakens with headaches:                                               DENIES  Morning or daytime tiredness/ sleepiness:                      DENIES            Dry mouth or sore throat in morning:                      DENIES                                   Phillips stage:  4                                   SACS score:6  Stop Bang                                  Dangers of non-compliance with treatment of ARNAUD explained to pt. ARNAUD handouts given to pt. ALBUTEROL Q 6 PRN  CONT. SAT MONITOR HS after surgery. O2 @ 3 lpm NC HS   RESPIRATORY ASSESSMENT Q SHIFT.                                              Referrals:    Pt. Phone Number:

## 2022-11-28 NOTE — PROGRESS NOTES
[facility]  : 1955  Primary: Medicare [employer] B  Secondary: [address]  at [address] [address].  [phone]        Physical Therapy Prehab Evaluation Summary:2022   Time In/Out   PT Charge Capture  Episode     MEDICAL/REFERRING DIAGNOSIS: Unilateral primary osteoarthritis, left knee [M17.12]  REFERRING PHYSICIAN: Jt Disla MD    ICD-10: Treatment Diagnosis:   Pain in Left Knee (M25.562)  Stiffness of Left Knee, Not elsewhere classified (I80.000)    DATE OF SURGERY: 22  Assessment:   COMMENTS:  Scheduled for L TKA. Reports B knee pain L > R. Independent with gait and ADLs. PROBLEM LIST:   (Impacting functional limitations):  Ms. Dixie Bernheim presents with the following lower extremity(s) problems:  Gait  Strength  Range of Motion  Home Exercise Program  Pain INTERVENTIONS PLANNED:   (Benefits and precautions of physical therapy have been discussed with the patient.)  Home Exercise Program  Educational Discussion       GOALS: (Goals have been discussed and agreed upon with patient.)  Discharge Goals: Time Frame: 1 Day  Patient will demonstrate independence with a home exercise program designed to increase functional technique and pain control to minimize functional deficits and optimize patient for total joint replacement. Subjective:   Past Medical History/Comorbidities:   Ms. Dixie Bernheim  has a past medical history of High cholesterol, Hypertension, and Obesity. Ms. Dixie Bernheim  has a past surgical history that includes Knee arthroscopy (Bilateral); Tonsillectomy; Hysterectomy; Cholecystectomy; and Gastric bypass surgery.     Allergies:  Codeine    Current Medications:  Refer to pre-assessment nursing note    Home Set-Up/Prior Level of Function:  Lives With: Parent  Type of Home: House  Home Layout: Two level, Able to Live on Main level with bedroom/bathroom  Home Access: Stairs to enter without rails  Entrance Stairs - Number of Steps: 1  Bathroom Shower/Tub: Walk-in shower, Tub/Shower unit  Home Equipment:  (none)    Dominant Side:  Dominant Hand: : Right    Current Functional Status:   Ambulation:  [x] Independent  [] Walk Indoors Only  [] Walk Outdoors  [] Use Assistive Device  [] Use Wheelchair Only Dressing:  [x] Independent  Requires Assistance from Someone for:  [] Sock/Shoes  [] Pants  [] Everything   Bathing/Showering:   [x] Independent  [] Requires Assistance from Someone  [] Sponge Bath Only Household Activities:  [] Routine house and yard work  [x] Light Housework Only  [] None     Objective:   PAIN:   Pre-Treatment Pain  Pain Assessment: 0-10  Pain Level: 7  Pain Location: Knee  Pain Orientation: Left, Right    Post Treatment: 7    Outcome Measure:   HOOS-JR:       KOOS-JR:  KOOS, . Knee Survey Score: 12    LOWER EXTREMITY GROSS EVALUATION:  RIGHT LE   Within Functional Limits   Abnormal   Comments   Strength [] []     Range of Motion [] [] AROM RLE (degrees)  R Knee Flexion 0-145: 115  R Knee Extension 0: 0      LEFT LE Within Functional Limits   Abnormal   Comments   Strength [] [] Strength LLE  L Hip Flexion: 3+/5  L Knee Flexion: 4-/5  L Knee Extension: 4-/5   Range of Motion [] [] AROM LLE (degrees)  L Knee Flexion 0-145: 95  L Knee Extension 0: 0     UPPER EXTREMITY GROSS EVALUATION:     Within Functional Limits   Abnormal   Comments   Strength [] []    Range of Motion [] []      SENSATION  Sensation [x]       COGNITION/  PERCEPTION: Intact Impaired (Comments):   Orientation [x]     Vision [x]     Hearing [x]     Cognition  [x]       TRANSFERS: I Mod I S SBA CGA Min Mod Max Total  NT x2 Comments:   Sit to Stand [x] [x] [] [] [] [] [] [] [] [] []    Stand to Sit [x] [] [] [] [] [] [] [] [] [] []    Stand pivot [x] [] [] [] [] [] [] [] [] [] []     [] [] [] [] [] [] [] [] [] [] []    I=Independent, Mod I=Modified Independent, S=Supervision, SBA=Standby Assistance, CGA=Contact Guard Assistance,   Min=Minimal Assistance, Mod=Moderate Assistance, Max=Maximal Assistance, Total=Total Assistance, NT=Not Tested    BALANCE: Good Fair+ Fair Fair- Poor NT Comments   Sitting Static [x] [] [] [] [] []    Sitting Dynamic [x] [] [] [] [] []              Standing Static [] [x] [] [] [] []    Standing Dynamic [] [x] [x] [] [] []      Postural Assessment: Forward Head  Rounded Shoulders    GAIT: I Mod I S SBA CGA Min Mod Max Total  NT x2 Comments:   Level of Assistance [x] [] [] [] [] [] [] [] [] [] []    Weightbearing Status       Distance  150 feet    Gait Quality Antalgic, Decreased rigo , Decreased step clearance, Decreased step length, and Decreased stance    DME None     Stairs      Ramp     I=Independent, Mod I=Modified Independent, S=Supervision, SBA=Standby Assistance, CGA=Contact Guard Assistance,   Min=Minimal Assistance, Mod=Moderate Assistance, Max=Maximal Assistance, Total=Total Assistance, NT=Not Tested    TREATMENT:   EVALUATION: LOW COMPLEXITY: (Untimed Charge)    TREATMENT PLAN:   Effective Dates: 11/28/2022 TO 11/28/2022. Treatment/Session Assessment:  Patient was instructed in PT- HEP to increase strength and ROM in LEs. Answered all questions. Frequency/Duration: Patient to continue to perform home exercise program at least twice per day up until her surgery. EDUCATION: Education Given To: Patient  Education Provided: Role of Therapy, Plan of Care, Home Exercise Program, Equipment  Education Method: Demonstration, Verbal, Printed Information/Hand-outs  Education Outcome: Verbalized understanding, Demonstrated understanding    MEDICAL NECESSITY: Ms. Zunilda Mendez is expected to optimize herlower extremity strength and ROM in preparation for joint replacement surgery. REASON FOR CONTINUED SERVICES: Achieve baseline assesment of musculoskeletal system, functional mobility and home environment. , educate in PT HEP in preparation for surgery, educate in hospital plan of care.    COMPLIANCE WITH PROGRAM/EXERCISE: Will assess as treatment progresses. TOTAL TREATMENT DURATION:  Time In: 1300  Time Out: 1330  Minutes: 27    Regarding Claritza Carroll's therapy, I certify that the treatment plan above will be carried out by a therapist or under their direction.   Thank you for this referral,  Collette Quill, PT

## 2022-11-28 NOTE — PERIOP NOTE
PLEASE CONTINUE TAKING ALL PRESCRIPTION MEDICATIONS UP TO THE DAY OF SURGERY UNLESS OTHERWISE DIRECTED BELOW. DISCONTINUE all vitamins, herbals and supplements 21 days prior to surgery. DISCONTINUE Non-Steriodal Anti-Inflammatory (NSAIDS) such as Advil, Ibuprofen, Motrin, Naproxen and Aleve 5 days prior to surgery. Home Medications to take  the day of surgery    Rosuvastatin           Home Medications   to Hold           Comments   Take 1 capful of Miralax daily starting one week prior to surgery. On the day before surgery please take Acetaminophen 1000mg in the morning and then again before bed. You may substitute for Tylenol 650 mg.    Bring Dynahex wash and Incentive Spirometer with you to hospital on the day of surgery. Please do not bring home medications with you on the day of surgery unless otherwise directed by your nurse. If you are instructed to bring home medications, please give them to your nurse as they will be administered by the nursing staff. If you have any questions, please call [phone]. A copy of this note was provided to the patient for reference.

## 2022-11-28 NOTE — PERIOP NOTE
Patient verified name and . Order for consent found in EHR and matches case posting; patient verified. Type 3 surgery, joint assessment complete. Labs per surgeon: CBC,BMP, PT/PTT, HgbA1c, Albumin, Nicotine; results pending. Labs per anesthesia protocol: no additional  EKG: Completed today; results to be reviewed by anesthesia. Charge nurse to f/u. MRSA/MSSA swab collected; pharmacy to review and dose antibiotic as appropriate. Hospital approved surgical skin cleanser and instructions to return bottle on DOS given per hospital policy. Patient provided with handouts including Guide to Surgery, Pain Management, Hand Hygiene, Blood Transfusion Education, and [facility] Brochure. Patient answered medical/surgical history questions at their best of ability. All prior to admission medications documented in [facility]. Original medication prescription bottle visualized during patient appointment. Patient instructed to hold all vitamins 3 weeks prior to surgery and NSAIDS 5 days prior to surgery. Patient teach back successful and patient demonstrates knowledge of instruction.

## 2022-11-29 PROBLEM — Z91.199 NONCOMPLIANCE WITH CPAP TREATMENT: Status: ACTIVE | Noted: 2022-11-29

## 2022-11-29 PROBLEM — Z91.14 NONCOMPLIANCE WITH CPAP TREATMENT: Status: ACTIVE | Noted: 2022-11-29

## 2022-11-29 LAB
COMMENT:: NORMAL
COTININE UR QL SCN: NEGATIVE NG/ML

## 2022-11-29 NOTE — PROGRESS NOTES
Total Joint Surgery Preoperative Chart Review      Patient ID:  Radha Pierre  [id number]  28 y.o.  1955  Surgeon: Dr. Mariana Kerr  Date of Surgery: 12/19/2022  Procedure: Total Left Knee Arthroplasty  Primary Care Physician: Corona Fitzgerald [phone]  Specialty Physician(s):      Subjective: Radha Pierre is a 78 y.o. White (non-) female who presents for preoperative evaluation for Total Left Knee arthroplasty. This is a preoperative chart review note based on data collected by the nurse at the surgical Pre-Assessment visit. Past Medical History:   Diagnosis Date    High cholesterol     managed with medication    Hypertension     managed with medication    Obesity       Past Surgical History:   Procedure Laterality Date    CHOLECYSTECTOMY      GASTRIC BYPASS SURGERY      sleeve    HYSTERECTOMY (CERVIX STATUS UNKNOWN)      KNEE ARTHROSCOPY Bilateral     TONSILLECTOMY       History reviewed. No pertinent family history. Social History     Tobacco Use    Smoking status: Never    Smokeless tobacco: Never   Substance Use Topics    Alcohol use: Never       Prior to Admission medications    Medication Sig Start Date End Date Taking? Authorizing Provider   ZINC PO Take by mouth   Yes Historical Provider, MD   lisinopril-hydroCHLOROthiazide (PRINZIDE;ZESTORETIC) 10-12.5 MG per tablet  5/3/22   Historical Provider, MD   rosuvastatin (CRESTOR) 10 MG tablet  5/3/22   Historical Provider, MD     Allergies   Allergen Reactions    Codeine      Other reaction(s): Nausea and/or vomiting-Intolerance  Passing out, tramadol ok          Objective:     Physical Exam:   Patient Vitals for the past 24 hrs:   Temp Pulse Resp BP SpO2   11/28/22 1230 -- 83 -- -- 97 %   11/28/22 1206 98 °F (36.7 °C) 85 18 (!) 155/69 97 %       ECG:    No results found for this or any previous visit (from the past 4464 hour(s)).     Data Review:   Labs:   Recent Results (from the past 24 hour(s))   Protime-INR Collection Time: 11/28/22 12:30 PM   Result Value Ref Range    Protime 13.6 12.6 - 14.3 sec    INR 1.0     Hemoglobin A1c    Collection Time: 11/28/22 12:30 PM   Result Value Ref Range    Hemoglobin A1C 5.7 (H) 4.8 - 5.6 %    eAG 117 mg/dL   CBC with Auto Differential    Collection Time: 11/28/22 12:30 PM   Result Value Ref Range    WBC 6.5 4.3 - 11.1 K/uL    RBC 4.88 4.05 - 5.2 M/uL    Hemoglobin 14.4 11.7 - 15.4 g/dL    Hematocrit 46.0 35.8 - 46.3 %    MCV 94.3 82.0 - 102.0 FL    MCH 29.5 26.1 - 32.9 PG    MCHC 31.3 (L) 31.4 - 35.0 g/dL    RDW 14.5 11.9 - 14.6 %    Platelets 495 173 - 288 K/uL    MPV 10.8 9.4 - 12.3 FL    nRBC 0.00 0.0 - 0.2 K/uL    Differential Type AUTOMATED      Seg Neutrophils 64 43 - 78 %    Lymphocytes 25 13 - 44 %    Monocytes 7 4.0 - 12.0 %    Eosinophils % 4 0.5 - 7.8 %    Basophils 1 0.0 - 2.0 %    Immature Granulocytes 0 0.0 - 5.0 %    Segs Absolute 4.1 1.7 - 8.2 K/UL    Absolute Lymph # 1.7 0.5 - 4.6 K/UL    Absolute Mono # 0.4 0.1 - 1.3 K/UL    Absolute Eos # 0.2 0.0 - 0.8 K/UL    Basophils Absolute 0.0 0.0 - 0.2 K/UL    Absolute Immature Granulocyte 0.0 0.0 - 0.5 K/UL   Basic Metabolic Panel    Collection Time: 11/28/22 12:30 PM   Result Value Ref Range    Sodium 140 133 - 143 mmol/L    Potassium 4.3 3.5 - 5.1 mmol/L    Chloride 103 101 - 110 mmol/L    CO2 32 21 - 32 mmol/L    Anion Gap 5 2 - 11 mmol/L    Glucose 95 65 - 100 mg/dL    BUN 17 8 - 23 MG/DL    Creatinine 0.68 0.6 - 1.0 MG/DL    Est, Glom Filt Rate >60 >60 ml/min/1.73m2    Calcium 10.1 8.3 - 10.4 MG/DL   APTT    Collection Time: 11/28/22 12:30 PM   Result Value Ref Range    PTT 32.0 24.5 - 34.2 SEC   MSSA/MRSA Screen BY PCR    Collection Time: 11/28/22 12:30 PM    Specimen: Nares; Swab   Result Value Ref Range    Special Requests NO SPECIAL REQUESTS      Culture        SA target not detected. A MRSA NEGATIVE, SA NEGATIVE test result does not preclude MRSA or SA nasal colonization.    Albumin Collection Time: 11/28/22 12:30 PM   Result Value Ref Range    Albumin 3.7 3.2 - 4.6 g/dL   EKG 12 lead    Collection Time: 11/28/22  1:04 PM   Result Value Ref Range    Ventricular Rate 85 BPM    Atrial Rate 85 BPM    P-R Interval 160 ms    QRS Duration 74 ms    Q-T Interval 352 ms    QTc Calculation (Bazett) 418 ms    P Axis 54 degrees    R Axis 63 degrees    T Axis 61 degrees    Diagnosis Normal sinus rhythm with sinus arrhythmia          Problem List:  )  Patient Active Problem List   Diagnosis    Primary osteoarthritis of left knee    Acquired deformity of left knee       Total Joint Surgery Pre-Assessment Recommendations: The patient is not compliant with wearing CPAP. Recommend oxygen saturation monitoring during hospitalization and oxygen at 3 liter via [facility].     Signed By: ROBYN Ferrara - CNP-C    November 29, 2022

## 2022-11-30 NOTE — PROGRESS NOTES
Nicotine Metabolites, Urine  Order: [id number]  Status: Final result    Visible to patient: Yes (not seen)    Next appt: 01/10/2023 at 10:05 AM in Orthopedic Surgery Misha Krishnamurthy MD)    Dx: Primary osteoarthritis of left knee    0 Result Notes  Component Ref Range & Units 11/28/22 1230  Resulting Agency   Cotinine Screen, Ur [id number] ng/mL Negative  [facility]   Comment:   Comment  [facility]   Comment: (NOTE)   This analysis is performed by immunoassay. Positive findings are   unconfirmed analytical test results; if results do not support   expected clinical finding, confirmation by an alternate methodology   is recommended. Patient metabolic variables, specific drug chemistry,   and specimen characteristics can affect test outcome. Technical consultation is available at [email or web address], or   call toll free [phone].    Performed At: [facility] & [address] [id number]   Ronald Boone MD EJ:[id number]   Performed At: [facility]   [address] [id number]   So Whitman PhD VU:[id number]               Specimen Collected: 11/28/22 12:30 EST Last Resulted: 11/29/22 19:36 EST

## 2022-12-08 ENCOUNTER — TELEPHONE (OUTPATIENT)
Dept: ORTHOPEDIC SURGERY | Age: 67
End: 2022-12-08

## 2022-12-08 NOTE — TELEPHONE ENCOUNTER
I called the patient and confirmed that her pre op appointment will be at [address] [place] [address].

## 2022-12-12 DIAGNOSIS — G89.18 POSTOPERATIVE PAIN: Primary | ICD-10-CM

## 2022-12-12 RX ORDER — SENNA PLUS 8.6 MG/1
1 TABLET ORAL 2 TIMES DAILY
Qty: 30 TABLET | Refills: 2 | Status: SHIPPED | OUTPATIENT
Start: 2022-12-12

## 2022-12-12 RX ORDER — ZOLPIDEM TARTRATE 10 MG/1
5 TABLET ORAL NIGHTLY PRN
Qty: 30 TABLET | Refills: 0 | Status: SHIPPED | OUTPATIENT
Start: 2022-12-12 | End: 2023-01-11

## 2022-12-12 RX ORDER — CYCLOBENZAPRINE HCL 5 MG
5 TABLET ORAL 3 TIMES DAILY PRN
Qty: 30 TABLET | Refills: 0 | Status: SHIPPED | OUTPATIENT
Start: 2022-12-12 | End: 2022-12-22

## 2022-12-12 RX ORDER — HYDROMORPHONE HYDROCHLORIDE 2 MG/1
2 TABLET ORAL EVERY 4 HOURS PRN
Qty: 40 TABLET | Refills: 0 | Status: SHIPPED | OUTPATIENT
Start: 2022-12-12 | End: 2022-12-19

## 2022-12-12 RX ORDER — ACETAMINOPHEN 500 MG
1000 TABLET ORAL EVERY 6 HOURS PRN
Qty: 180 TABLET | Refills: 2 | Status: SHIPPED | OUTPATIENT
Start: 2022-12-12

## 2022-12-12 RX ORDER — ONDANSETRON 4 MG/1
4 TABLET, FILM COATED ORAL 3 TIMES DAILY PRN
Qty: 30 TABLET | Refills: 1 | Status: SHIPPED | OUTPATIENT
Start: 2022-12-12

## 2022-12-12 RX ORDER — ASPIRIN 81 MG/1
81 TABLET ORAL 2 TIMES DAILY
Qty: 60 TABLET | Refills: 0 | Status: SHIPPED | OUTPATIENT
Start: 2022-12-12

## 2022-12-12 RX ORDER — MELOXICAM 15 MG/1
15 TABLET ORAL DAILY
Qty: 30 TABLET | Refills: 2 | Status: SHIPPED | OUTPATIENT
Start: 2022-12-12

## 2022-12-12 RX ORDER — OMEPRAZOLE 40 MG/1
40 CAPSULE, DELAYED RELEASE ORAL DAILY
Qty: 30 CAPSULE | Refills: 0 | Status: SHIPPED | OUTPATIENT
Start: 2022-12-12

## 2022-12-12 RX ORDER — GABAPENTIN 300 MG/1
300 CAPSULE ORAL 2 TIMES DAILY
Qty: 30 CAPSULE | Refills: 0 | Status: SHIPPED | OUTPATIENT
Start: 2022-12-12 | End: 2022-12-27

## 2022-12-19 ENCOUNTER — HOME HEALTH ADMISSION (OUTPATIENT)
Dept: HOME HEALTH SERVICES | Facility: HOME HEALTH | Age: 67
End: 2022-12-19
Payer: MEDICARE

## 2022-12-19 ENCOUNTER — HOSPITAL ENCOUNTER (OUTPATIENT)
Age: 67
Discharge: HOME HEALTH | End: 2022-12-19
Attending: ORTHOPAEDIC SURGERY | Admitting: ORTHOPAEDIC SURGERY
Payer: MEDICARE

## 2022-12-19 ENCOUNTER — ANESTHESIA EVENT (OUTPATIENT)
Dept: SURGERY | Age: 67
End: 2022-12-19
Payer: MEDICARE

## 2022-12-19 ENCOUNTER — ANESTHESIA (OUTPATIENT)
Dept: SURGERY | Age: 67
End: 2022-12-19
Payer: MEDICARE

## 2022-12-19 VITALS
WEIGHT: 225.31 LBS | BODY MASS INDEX: 38.67 KG/M2 | RESPIRATION RATE: 16 BRPM | SYSTOLIC BLOOD PRESSURE: 119 MMHG | TEMPERATURE: 97.9 F | OXYGEN SATURATION: 94 % | DIASTOLIC BLOOD PRESSURE: 58 MMHG | HEART RATE: 79 BPM

## 2022-12-19 DIAGNOSIS — M21.962 ACQUIRED DEFORMITY OF LEFT KNEE: ICD-10-CM

## 2022-12-19 DIAGNOSIS — M17.12 PRIMARY OSTEOARTHRITIS OF LEFT KNEE: ICD-10-CM

## 2022-12-19 PROCEDURE — 2500000003 HC RX 250 WO HCPCS: Performed by: ANESTHESIOLOGY

## 2022-12-19 PROCEDURE — 3700000001 HC ADD 15 MINUTES (ANESTHESIA): Performed by: ORTHOPAEDIC SURGERY

## 2022-12-19 PROCEDURE — 64447 NJX AA&/STRD FEMORAL NRV IMG: CPT | Performed by: ANESTHESIOLOGY

## 2022-12-19 PROCEDURE — 27447 TOTAL KNEE ARTHROPLASTY: CPT | Performed by: ORTHOPAEDIC SURGERY

## 2022-12-19 PROCEDURE — C1713 ANCHOR/SCREW BN/BN,TIS/BN: HCPCS | Performed by: ORTHOPAEDIC SURGERY

## 2022-12-19 PROCEDURE — 6360000002 HC RX W HCPCS: Performed by: ORTHOPAEDIC SURGERY

## 2022-12-19 PROCEDURE — 27447 TOTAL KNEE ARTHROPLASTY: CPT | Performed by: NURSE PRACTITIONER

## 2022-12-19 PROCEDURE — 7100000001 HC PACU RECOVERY - ADDTL 15 MIN: Performed by: ORTHOPAEDIC SURGERY

## 2022-12-19 PROCEDURE — 2500000003 HC RX 250 WO HCPCS: Performed by: NURSE ANESTHETIST, CERTIFIED REGISTERED

## 2022-12-19 PROCEDURE — 2580000003 HC RX 258: Performed by: ANESTHESIOLOGY

## 2022-12-19 PROCEDURE — 97530 THERAPEUTIC ACTIVITIES: CPT

## 2022-12-19 PROCEDURE — 97535 SELF CARE MNGMENT TRAINING: CPT

## 2022-12-19 PROCEDURE — C1776 JOINT DEVICE (IMPLANTABLE): HCPCS | Performed by: ORTHOPAEDIC SURGERY

## 2022-12-19 PROCEDURE — 3600000005 HC SURGERY LEVEL 5 BASE: Performed by: ORTHOPAEDIC SURGERY

## 2022-12-19 PROCEDURE — 6370000000 HC RX 637 (ALT 250 FOR IP): Performed by: NURSE PRACTITIONER

## 2022-12-19 PROCEDURE — 6360000002 HC RX W HCPCS: Performed by: NURSE ANESTHETIST, CERTIFIED REGISTERED

## 2022-12-19 PROCEDURE — 6360000002 HC RX W HCPCS

## 2022-12-19 PROCEDURE — 6360000002 HC RX W HCPCS: Performed by: NURSE PRACTITIONER

## 2022-12-19 PROCEDURE — 0055T BONE SRGRY CMPTR CT/MRI IMAG: CPT | Performed by: ORTHOPAEDIC SURGERY

## 2022-12-19 PROCEDURE — 6370000000 HC RX 637 (ALT 250 FOR IP): Performed by: ANESTHESIOLOGY

## 2022-12-19 PROCEDURE — 6360000002 HC RX W HCPCS: Performed by: ANESTHESIOLOGY

## 2022-12-19 PROCEDURE — 2580000003 HC RX 258: Performed by: NURSE ANESTHETIST, CERTIFIED REGISTERED

## 2022-12-19 PROCEDURE — 7100000000 HC PACU RECOVERY - FIRST 15 MIN: Performed by: ORTHOPAEDIC SURGERY

## 2022-12-19 PROCEDURE — 2709999900 HC NON-CHARGEABLE SUPPLY: Performed by: ORTHOPAEDIC SURGERY

## 2022-12-19 PROCEDURE — 3700000000 HC ANESTHESIA ATTENDED CARE: Performed by: ORTHOPAEDIC SURGERY

## 2022-12-19 PROCEDURE — 3600000015 HC SURGERY LEVEL 5 ADDTL 15MIN: Performed by: ORTHOPAEDIC SURGERY

## 2022-12-19 PROCEDURE — 2720000010 HC SURG SUPPLY STERILE: Performed by: ORTHOPAEDIC SURGERY

## 2022-12-19 PROCEDURE — 97161 PT EVAL LOW COMPLEX 20 MIN: CPT

## 2022-12-19 PROCEDURE — 97165 OT EVAL LOW COMPLEX 30 MIN: CPT

## 2022-12-19 DEVICE — CRUCIATE RETAINING FEMORAL
Type: IMPLANTABLE DEVICE | Site: KNEE | Status: FUNCTIONAL
Brand: TRIATHLON

## 2022-12-19 DEVICE — COMPONENT TOT KNEE CAPPED PRIMARY K2STRYKER] STRYKER CORP]: Type: IMPLANTABLE DEVICE | Status: FUNCTIONAL

## 2022-12-19 DEVICE — TIBIAL COMPONENT
Type: IMPLANTABLE DEVICE | Site: KNEE | Status: FUNCTIONAL
Brand: TRIATHLON

## 2022-12-19 DEVICE — INSERT TIB CS 3 12 MM ARTC POST KNEE BEAR TECHNOLOGY X3: Type: IMPLANTABLE DEVICE | Site: KNEE | Status: FUNCTIONAL

## 2022-12-19 RX ORDER — ROPIVACAINE HYDROCHLORIDE 2 MG/ML
INJECTION, SOLUTION EPIDURAL; INFILTRATION; PERINEURAL
Status: COMPLETED | OUTPATIENT
Start: 2022-12-19 | End: 2022-12-19

## 2022-12-19 RX ORDER — SODIUM CHLORIDE 9 MG/ML
INJECTION, SOLUTION INTRAVENOUS CONTINUOUS
Status: DISCONTINUED | OUTPATIENT
Start: 2022-12-19 | End: 2022-12-19 | Stop reason: HOSPADM

## 2022-12-19 RX ORDER — ZOLPIDEM TARTRATE 5 MG/1
5 TABLET ORAL NIGHTLY PRN
Status: DISCONTINUED | OUTPATIENT
Start: 2022-12-19 | End: 2022-12-19 | Stop reason: HOSPADM

## 2022-12-19 RX ORDER — LISINOPRIL AND HYDROCHLOROTHIAZIDE 12.5; 1 MG/1; MG/1
1 TABLET ORAL DAILY
Status: DISCONTINUED | OUTPATIENT
Start: 2022-12-19 | End: 2022-12-19 | Stop reason: HOSPADM

## 2022-12-19 RX ORDER — ONDANSETRON 2 MG/ML
4 INJECTION INTRAMUSCULAR; INTRAVENOUS EVERY 6 HOURS PRN
Status: DISCONTINUED | OUTPATIENT
Start: 2022-12-19 | End: 2022-12-19 | Stop reason: HOSPADM

## 2022-12-19 RX ORDER — SODIUM CHLORIDE 0.9 % (FLUSH) 0.9 %
5-40 SYRINGE (ML) INJECTION PRN
Status: DISCONTINUED | OUTPATIENT
Start: 2022-12-19 | End: 2022-12-19 | Stop reason: HOSPADM

## 2022-12-19 RX ORDER — MIDAZOLAM HYDROCHLORIDE 2 MG/2ML
2 INJECTION, SOLUTION INTRAMUSCULAR; INTRAVENOUS
Status: COMPLETED | OUTPATIENT
Start: 2022-12-19 | End: 2022-12-19

## 2022-12-19 RX ORDER — VANCOMYCIN HYDROCHLORIDE 1 G/20ML
INJECTION, POWDER, LYOPHILIZED, FOR SOLUTION INTRAVENOUS PRN
Status: DISCONTINUED | OUTPATIENT
Start: 2022-12-19 | End: 2022-12-19 | Stop reason: HOSPADM

## 2022-12-19 RX ORDER — SODIUM CHLORIDE 9 MG/ML
INJECTION, SOLUTION INTRAVENOUS PRN
Status: DISCONTINUED | OUTPATIENT
Start: 2022-12-19 | End: 2022-12-19 | Stop reason: HOSPADM

## 2022-12-19 RX ORDER — ONDANSETRON 4 MG/1
4 TABLET, ORALLY DISINTEGRATING ORAL EVERY 8 HOURS PRN
Status: DISCONTINUED | OUTPATIENT
Start: 2022-12-19 | End: 2022-12-19 | Stop reason: HOSPADM

## 2022-12-19 RX ORDER — SODIUM CHLORIDE 0.9 % (FLUSH) 0.9 %
5-40 SYRINGE (ML) INJECTION EVERY 12 HOURS SCHEDULED
Status: DISCONTINUED | OUTPATIENT
Start: 2022-12-19 | End: 2022-12-19 | Stop reason: HOSPADM

## 2022-12-19 RX ORDER — LABETALOL HYDROCHLORIDE 5 MG/ML
10 INJECTION, SOLUTION INTRAVENOUS
Status: DISCONTINUED | OUTPATIENT
Start: 2022-12-19 | End: 2022-12-19 | Stop reason: HOSPADM

## 2022-12-19 RX ORDER — HYDROMORPHONE HYDROCHLORIDE 2 MG/1
2 TABLET ORAL EVERY 4 HOURS PRN
Status: DISCONTINUED | OUTPATIENT
Start: 2022-12-19 | End: 2022-12-19 | Stop reason: HOSPADM

## 2022-12-19 RX ORDER — ACETAMINOPHEN 500 MG
1000 TABLET ORAL ONCE
Status: COMPLETED | OUTPATIENT
Start: 2022-12-19 | End: 2022-12-19

## 2022-12-19 RX ORDER — HYDROMORPHONE HYDROCHLORIDE 1 MG/ML
0.5 INJECTION, SOLUTION INTRAMUSCULAR; INTRAVENOUS; SUBCUTANEOUS EVERY 5 MIN PRN
Status: DISCONTINUED | OUTPATIENT
Start: 2022-12-19 | End: 2022-12-19 | Stop reason: HOSPADM

## 2022-12-19 RX ORDER — OXYCODONE HYDROCHLORIDE 5 MG/1
10 TABLET ORAL PRN
Status: DISCONTINUED | OUTPATIENT
Start: 2022-12-19 | End: 2022-12-19 | Stop reason: HOSPADM

## 2022-12-19 RX ORDER — PROPOFOL 10 MG/ML
INJECTION, EMULSION INTRAVENOUS CONTINUOUS PRN
Status: DISCONTINUED | OUTPATIENT
Start: 2022-12-19 | End: 2022-12-19 | Stop reason: SDUPTHER

## 2022-12-19 RX ORDER — FENTANYL CITRATE 50 UG/ML
100 INJECTION, SOLUTION INTRAMUSCULAR; INTRAVENOUS
Status: DISCONTINUED | OUTPATIENT
Start: 2022-12-19 | End: 2022-12-19 | Stop reason: HOSPADM

## 2022-12-19 RX ORDER — ONDANSETRON 2 MG/ML
4 INJECTION INTRAMUSCULAR; INTRAVENOUS
Status: DISCONTINUED | OUTPATIENT
Start: 2022-12-19 | End: 2022-12-19 | Stop reason: HOSPADM

## 2022-12-19 RX ORDER — TOBRAMYCIN 1.2 G/30ML
INJECTION, POWDER, LYOPHILIZED, FOR SOLUTION INTRAVENOUS PRN
Status: DISCONTINUED | OUTPATIENT
Start: 2022-12-19 | End: 2022-12-19 | Stop reason: HOSPADM

## 2022-12-19 RX ORDER — DIPHENHYDRAMINE HYDROCHLORIDE 50 MG/ML
25 INJECTION INTRAMUSCULAR; INTRAVENOUS EVERY 6 HOURS PRN
Status: DISCONTINUED | OUTPATIENT
Start: 2022-12-19 | End: 2022-12-19 | Stop reason: HOSPADM

## 2022-12-19 RX ORDER — SODIUM CHLORIDE, SODIUM LACTATE, POTASSIUM CHLORIDE, CALCIUM CHLORIDE 600; 310; 30; 20 MG/100ML; MG/100ML; MG/100ML; MG/100ML
INJECTION, SOLUTION INTRAVENOUS CONTINUOUS
Status: DISCONTINUED | OUTPATIENT
Start: 2022-12-19 | End: 2022-12-19 | Stop reason: HOSPADM

## 2022-12-19 RX ORDER — ROSUVASTATIN CALCIUM 5 MG/1
10 TABLET, COATED ORAL NIGHTLY
Status: DISCONTINUED | OUTPATIENT
Start: 2022-12-19 | End: 2022-12-19 | Stop reason: HOSPADM

## 2022-12-19 RX ORDER — CELECOXIB 100 MG/1
100 CAPSULE ORAL ONCE
Status: COMPLETED | OUTPATIENT
Start: 2022-12-19 | End: 2022-12-19

## 2022-12-19 RX ORDER — KETOROLAC TROMETHAMINE 30 MG/ML
INJECTION, SOLUTION INTRAMUSCULAR; INTRAVENOUS PRN
Status: DISCONTINUED | OUTPATIENT
Start: 2022-12-19 | End: 2022-12-19 | Stop reason: SDUPTHER

## 2022-12-19 RX ORDER — ACETAMINOPHEN 500 MG
1000 TABLET ORAL EVERY 6 HOURS
Status: DISCONTINUED | OUTPATIENT
Start: 2022-12-20 | End: 2022-12-19 | Stop reason: HOSPADM

## 2022-12-19 RX ORDER — LIDOCAINE HYDROCHLORIDE 10 MG/ML
1 INJECTION, SOLUTION INFILTRATION; PERINEURAL
Status: DISCONTINUED | OUTPATIENT
Start: 2022-12-19 | End: 2022-12-19 | Stop reason: HOSPADM

## 2022-12-19 RX ORDER — ONDANSETRON 8 MG/1
8 TABLET, ORALLY DISINTEGRATING ORAL EVERY 8 HOURS PRN
Status: DISCONTINUED | OUTPATIENT
Start: 2022-12-19 | End: 2022-12-19 | Stop reason: HOSPADM

## 2022-12-19 RX ORDER — KETOROLAC TROMETHAMINE 30 MG/ML
INJECTION, SOLUTION INTRAMUSCULAR; INTRAVENOUS PRN
Status: DISCONTINUED | OUTPATIENT
Start: 2022-12-19 | End: 2022-12-19 | Stop reason: HOSPADM

## 2022-12-19 RX ORDER — DIPHENHYDRAMINE HCL 25 MG
25 CAPSULE ORAL EVERY 6 HOURS PRN
Status: DISCONTINUED | OUTPATIENT
Start: 2022-12-19 | End: 2022-12-19 | Stop reason: HOSPADM

## 2022-12-19 RX ORDER — DEXAMETHASONE SODIUM PHOSPHATE 10 MG/ML
INJECTION INTRAMUSCULAR; INTRAVENOUS PRN
Status: DISCONTINUED | OUTPATIENT
Start: 2022-12-19 | End: 2022-12-19 | Stop reason: SDUPTHER

## 2022-12-19 RX ORDER — KETOROLAC TROMETHAMINE 30 MG/ML
15 INJECTION, SOLUTION INTRAMUSCULAR; INTRAVENOUS EVERY 8 HOURS
Status: DISCONTINUED | OUTPATIENT
Start: 2022-12-19 | End: 2022-12-19 | Stop reason: HOSPADM

## 2022-12-19 RX ORDER — ONDANSETRON 2 MG/ML
INJECTION INTRAMUSCULAR; INTRAVENOUS PRN
Status: DISCONTINUED | OUTPATIENT
Start: 2022-12-19 | End: 2022-12-19 | Stop reason: SDUPTHER

## 2022-12-19 RX ORDER — HYDROMORPHONE HYDROCHLORIDE 2 MG/ML
1 INJECTION, SOLUTION INTRAMUSCULAR; INTRAVENOUS; SUBCUTANEOUS
Status: DISCONTINUED | OUTPATIENT
Start: 2022-12-19 | End: 2022-12-19 | Stop reason: HOSPADM

## 2022-12-19 RX ORDER — TRANEXAMIC ACID 650 MG/1
1300 TABLET ORAL
Status: DISCONTINUED | OUTPATIENT
Start: 2022-12-19 | End: 2022-12-19 | Stop reason: HOSPADM

## 2022-12-19 RX ORDER — DEXAMETHASONE SODIUM PHOSPHATE 10 MG/ML
10 INJECTION INTRAMUSCULAR; INTRAVENOUS ONCE
Status: DISCONTINUED | OUTPATIENT
Start: 2022-12-20 | End: 2022-12-19 | Stop reason: HOSPADM

## 2022-12-19 RX ORDER — DEXTROSE MONOHYDRATE 100 MG/ML
INJECTION, SOLUTION INTRAVENOUS CONTINUOUS PRN
Status: DISCONTINUED | OUTPATIENT
Start: 2022-12-19 | End: 2022-12-19 | Stop reason: HOSPADM

## 2022-12-19 RX ORDER — CYCLOBENZAPRINE HCL 5 MG
5 TABLET ORAL 3 TIMES DAILY PRN
Status: DISCONTINUED | OUTPATIENT
Start: 2022-12-19 | End: 2022-12-19 | Stop reason: HOSPADM

## 2022-12-19 RX ORDER — OXYCODONE HCL 10 MG/1
10 TABLET, FILM COATED, EXTENDED RELEASE ORAL EVERY 12 HOURS SCHEDULED
Status: DISCONTINUED | OUTPATIENT
Start: 2022-12-19 | End: 2022-12-19 | Stop reason: HOSPADM

## 2022-12-19 RX ORDER — NALOXONE HYDROCHLORIDE 0.4 MG/ML
0.4 INJECTION, SOLUTION INTRAMUSCULAR; INTRAVENOUS; SUBCUTANEOUS PRN
Status: DISCONTINUED | OUTPATIENT
Start: 2022-12-19 | End: 2022-12-19 | Stop reason: HOSPADM

## 2022-12-19 RX ORDER — MELOXICAM 7.5 MG/1
7.5 TABLET ORAL 2 TIMES DAILY
Status: DISCONTINUED | OUTPATIENT
Start: 2022-12-19 | End: 2022-12-19 | Stop reason: HOSPADM

## 2022-12-19 RX ORDER — HYDROMORPHONE HYDROCHLORIDE 1 MG/ML
0.25 INJECTION, SOLUTION INTRAMUSCULAR; INTRAVENOUS; SUBCUTANEOUS EVERY 5 MIN PRN
Status: DISCONTINUED | OUTPATIENT
Start: 2022-12-19 | End: 2022-12-19 | Stop reason: HOSPADM

## 2022-12-19 RX ORDER — FENTANYL CITRATE 50 UG/ML
25 INJECTION, SOLUTION INTRAMUSCULAR; INTRAVENOUS
Status: DISCONTINUED | OUTPATIENT
Start: 2022-12-19 | End: 2022-12-19 | Stop reason: HOSPADM

## 2022-12-19 RX ORDER — SENNA AND DOCUSATE SODIUM 50; 8.6 MG/1; MG/1
1 TABLET, FILM COATED ORAL 2 TIMES DAILY
Status: DISCONTINUED | OUTPATIENT
Start: 2022-12-19 | End: 2022-12-19 | Stop reason: HOSPADM

## 2022-12-19 RX ORDER — TRANEXAMIC ACID 100 MG/ML
INJECTION, SOLUTION INTRAVENOUS PRN
Status: DISCONTINUED | OUTPATIENT
Start: 2022-12-19 | End: 2022-12-19 | Stop reason: SDUPTHER

## 2022-12-19 RX ORDER — SODIUM CHLORIDE, SODIUM LACTATE, POTASSIUM CHLORIDE, CALCIUM CHLORIDE 600; 310; 30; 20 MG/100ML; MG/100ML; MG/100ML; MG/100ML
INJECTION, SOLUTION INTRAVENOUS CONTINUOUS PRN
Status: DISCONTINUED | OUTPATIENT
Start: 2022-12-19 | End: 2022-12-19 | Stop reason: SDUPTHER

## 2022-12-19 RX ORDER — ASPIRIN 81 MG/1
81 TABLET ORAL 2 TIMES DAILY
Status: DISCONTINUED | OUTPATIENT
Start: 2022-12-19 | End: 2022-12-19 | Stop reason: HOSPADM

## 2022-12-19 RX ORDER — PANTOPRAZOLE SODIUM 40 MG/1
40 TABLET, DELAYED RELEASE ORAL
Status: DISCONTINUED | OUTPATIENT
Start: 2022-12-20 | End: 2022-12-19 | Stop reason: HOSPADM

## 2022-12-19 RX ORDER — OXYCODONE HYDROCHLORIDE 5 MG/1
5 TABLET ORAL PRN
Status: DISCONTINUED | OUTPATIENT
Start: 2022-12-19 | End: 2022-12-19 | Stop reason: HOSPADM

## 2022-12-19 RX ORDER — ROPIVACAINE HYDROCHLORIDE 2 MG/ML
INJECTION, SOLUTION EPIDURAL; INFILTRATION; PERINEURAL PRN
Status: DISCONTINUED | OUTPATIENT
Start: 2022-12-19 | End: 2022-12-19 | Stop reason: HOSPADM

## 2022-12-19 RX ORDER — GABAPENTIN 300 MG/1
300 CAPSULE ORAL 2 TIMES DAILY
Status: DISCONTINUED | OUTPATIENT
Start: 2022-12-19 | End: 2022-12-19 | Stop reason: HOSPADM

## 2022-12-19 RX ORDER — LIDOCAINE HYDROCHLORIDE 20 MG/ML
INJECTION, SOLUTION EPIDURAL; INFILTRATION; INTRACAUDAL; PERINEURAL PRN
Status: DISCONTINUED | OUTPATIENT
Start: 2022-12-19 | End: 2022-12-19 | Stop reason: SDUPTHER

## 2022-12-19 RX ADMIN — LIDOCAINE HYDROCHLORIDE 100 MG: 20 INJECTION, SOLUTION EPIDURAL; INFILTRATION; INTRACAUDAL; PERINEURAL at 09:25

## 2022-12-19 RX ADMIN — SODIUM CHLORIDE, SODIUM LACTATE, POTASSIUM CHLORIDE, AND CALCIUM CHLORIDE: 600; 310; 30; 20 INJECTION, SOLUTION INTRAVENOUS at 09:34

## 2022-12-19 RX ADMIN — ACETAMINOPHEN 1000 MG: 500 TABLET, FILM COATED ORAL at 07:45

## 2022-12-19 RX ADMIN — PHENYLEPHRINE HYDROCHLORIDE 100 MCG: 0.1 INJECTION, SOLUTION INTRAVENOUS at 09:26

## 2022-12-19 RX ADMIN — ONDANSETRON 4 MG: 2 INJECTION INTRAMUSCULAR; INTRAVENOUS at 09:25

## 2022-12-19 RX ADMIN — PHENYLEPHRINE HYDROCHLORIDE 100 MCG: 0.1 INJECTION, SOLUTION INTRAVENOUS at 09:21

## 2022-12-19 RX ADMIN — PHENYLEPHRINE HYDROCHLORIDE 100 MCG: 0.1 INJECTION, SOLUTION INTRAVENOUS at 09:32

## 2022-12-19 RX ADMIN — MIDAZOLAM 2 MG: 1 INJECTION INTRAMUSCULAR; INTRAVENOUS at 08:34

## 2022-12-19 RX ADMIN — MEPIVACAINE HYDROCHLORIDE 60 MG: 20 INJECTION, SOLUTION EPIDURAL; INFILTRATION at 09:09

## 2022-12-19 RX ADMIN — CELECOXIB 100 MG: 100 CAPSULE ORAL at 07:45

## 2022-12-19 RX ADMIN — HYDROMORPHONE HYDROCHLORIDE 2 MG: 2 TABLET ORAL at 16:18

## 2022-12-19 RX ADMIN — TRANEXAMIC ACID 1300 MG: 650 TABLET ORAL at 13:23

## 2022-12-19 RX ADMIN — KETOROLAC TROMETHAMINE 30 MG: 30 INJECTION, SOLUTION INTRAMUSCULAR; INTRAVENOUS at 10:28

## 2022-12-19 RX ADMIN — DEXAMETHASONE SODIUM PHOSPHATE 4 MG: 4 INJECTION, SOLUTION INTRAMUSCULAR; INTRAVENOUS at 08:34

## 2022-12-19 RX ADMIN — Medication 2 G: at 09:00

## 2022-12-19 RX ADMIN — VANCOMYCIN HYDROCHLORIDE 1500 MG: 1 INJECTION, POWDER, LYOPHILIZED, FOR SOLUTION INTRAVENOUS at 10:09

## 2022-12-19 RX ADMIN — TRANEXAMIC ACID 1000 MG: 1 INJECTION, SOLUTION INTRAVENOUS at 09:12

## 2022-12-19 RX ADMIN — PROPOFOL 50 MCG/KG/MIN: 10 INJECTION, EMULSION INTRAVENOUS at 09:25

## 2022-12-19 RX ADMIN — TRANEXAMIC ACID 1000 MG: 1 INJECTION, SOLUTION INTRAVENOUS at 09:39

## 2022-12-19 RX ADMIN — FENTANYL CITRATE 100 MCG: 50 INJECTION, SOLUTION INTRAMUSCULAR; INTRAVENOUS at 08:34

## 2022-12-19 RX ADMIN — ROPIVACAINE HYDROCHLORIDE 20 ML: 2 INJECTION, SOLUTION EPIDURAL; INFILTRATION at 08:34

## 2022-12-19 RX ADMIN — HYDROMORPHONE HYDROCHLORIDE 2 MG: 2 TABLET ORAL at 12:19

## 2022-12-19 RX ADMIN — DEXAMETHASONE SODIUM PHOSPHATE 10 MG: 10 INJECTION INTRAMUSCULAR; INTRAVENOUS at 09:25

## 2022-12-19 RX ADMIN — SODIUM CHLORIDE, SODIUM LACTATE, POTASSIUM CHLORIDE, AND CALCIUM CHLORIDE: 600; 310; 30; 20 INJECTION, SOLUTION INTRAVENOUS at 09:00

## 2022-12-19 RX ADMIN — SODIUM CHLORIDE, POTASSIUM CHLORIDE, SODIUM LACTATE AND CALCIUM CHLORIDE: 600; 310; 30; 20 INJECTION, SOLUTION INTRAVENOUS at 08:00

## 2022-12-19 ASSESSMENT — PAIN SCALES - GENERAL
PAINLEVEL_OUTOF10: 5
PAINLEVEL_OUTOF10: 3

## 2022-12-19 ASSESSMENT — PAIN - FUNCTIONAL ASSESSMENT: PAIN_FUNCTIONAL_ASSESSMENT: 0-10

## 2022-12-19 NOTE — PROGRESS NOTES
OCCUPATIONAL THERAPY Initial Assessment and PM      (Link to Caseload Tracking: OT Visit Days: 1  OT Orders   Time  OT Charge Capture  Rehab Caseload Tracker  Episode     Matthew Hdez is a 78 y.o. female   PRIMARY DIAGNOSIS: Osteoarthritis of left knee, unspecified osteoarthritis type  Primary osteoarthritis of left knee [M17.12]  Acquired deformity of left knee [M21.962]  Osteoarthritis of left knee, unspecified osteoarthritis type [M17.12]  Procedure(s) (LRB):  Left KNEE TOTAL ARTHROPLASTY ROBOTIC/ SDD (Left)  Day of Surgery  Reason for Referral: Pain in Left Knee (M25.562)  Stiffness of Left Knee, Not elsewhere classified (M25.662)  Generalized Muscle Weakness (M62.81)  Other lack of cordination (R27.8)  Difficulty in walking, Not elsewhere classified (R26.2)  Other abnormalities of gait and mobility (R26.89)  Outpatient in a bed: Payor: MEDICARE / Plan: MEDICARE PART A AND B / Product Type: *No Product type* /     ASSESSMENT:     REHAB RECOMMENDATIONS:   Recommendation to date pending progress:  Setting:  Home Health Therapy    Equipment:    3 in 1 Bedside Commode  Rolling Walker     ASSESSMENT:  Ms. Moe East is s/p left TKA  with small wound vac and presents with decreased independence with functional mobility and activities of daily living as compared to baseline level of function and safety. Patient would benefit from skilled Occupational Therapy to maximize independence and safety with self-care task and functional mobility. Patient in the restroom with CNA and nursing. She was dressing with min assist. She ambulated to the recliner with CGA. She sat in recliner and her friend was present. She was educated on ADL task performance and home safety. She was edcuated on post op adls tasks and Dme . She was issued a RW and BSC. She plans to return home with her elderly parents. Will follow.        [facility] [address] [address] AM-PAC 6 Clicks Daily Activity Inpatient Short Form:    AM-PAC Daily Activity Inpatient   How much help for putting on and taking off regular lower body clothing?: A Little  How much help for Bathing?: A Little  How much help for Toileting?: A Little  How much help for putting on and taking off regular upper body clothing?: None  How much help for taking care of personal grooming?: None  How much help for eating meals?: None  AM-PAC Inpatient Daily Activity Raw Score: 21  AM-PAC Inpatient ADL T-Scale Score : 44.27  ADL Inpatient CMS 0-100% Score: 32.79  ADL Inpatient CMS G-Code Modifier : CJ     SUBJECTIVE:     Ms. Lara Dowling stated her dad has been falling at home      Social/Functional   Lives With: Parent  Type of Home: House  Home Layout: Two level, Able to Live on Main level with bedroom/bathroom  Home Access: Stairs to enter without rails  Entrance Stairs - Number of Steps: 1  Bathroom Shower/Tub: Walk-in shower, Tub/Shower unit  Home Equipment:  (none)    OBJECTIVE:     Corin Gomeze / Chavira Butter / Leena Close: Wound Vac    RESTRICTIONS/PRECAUTIONS:   fall    PAIN: Lindaann Left / O2:   Pre Treatment:      3/10    Post Treatment: 3/10 Vitals          Oxygen        GROSS EVALUATION: INTACT IMPAIRED   (See Comments)   UE AROM [x] []   UE PROM [] []   Strength [x]       Posture / Balance []  Cga in standing   Sensation []     Coordination []  Cga in standing     Tone []       Edema []    Activity Tolerance []  Fair +with mobility     Hand Dominance R [] L []      COGNITION/  PERCEPTION: INTACT IMPAIRED   (See Comments)   Orientation [x]     Vision [x]     Hearing [x]     Cognition  [x]     Perception [x]       MOBILITY: I Mod I S SBA CGA Min Mod Max Total  NT x2 Comments:   Bed Mobility    Rolling [] [] [] [] [] [] [] [] [] [x] []    Supine to Sit [] [] [] [] [] [] [] [] [] [x] []    Scooting [] [] [] [] [] [] [] [] [] [x] []    Sit to Supine [] [] [] [] [] [] [] [] [] [x] []    Transfers    Sit to Stand [] [] [] [] [x] [] [] [] [] [] []    Bed to Chair [] [] [] [] [] [] [] [] [] [] []    Stand to Sit [] [] [] [] [x] [] [] [] [] [] []    Tub/Shower [] [] [] [] [] [] [] [] [] [] []       Toilet [] [] [] [] [x] [] [] [] [] [] []        [] [] [] [] [] [] [] [] [] [] []    I=Independent, Mod I=Modified Independent, S=Supervision/Setup, SBA=Standby Assistance, CGA=Contact Guard Assistance, Min=Minimal Assistance, Mod=Moderate Assistance, Max=Maximal Assistance, Total=Total Assistance, NT=Not Tested    ACTIVITIES OF DAILY LIVING: I Mod I S SBA CGA Min Mod Max Total NT Comments   BASIC ADLs:              Upper Body Bathing [] [] [] [] [] [] [] [] [] []    Lower Body Bathing [] [] [] [] [] [] [] [] [] []    Toileting [] [] [] [] [] [] [] [] [] []    Upper Body Dressing [] [] [x] [] [] [] [] [] [] []    Lower Body Dressing [] [] [] [] [] [x] [] [] [] []    Feeding [] [] [x] [] [] [] [] [] [] []    Grooming [] [] [x] [] [] [] [] [] [] []    Personal Device Care [] [] [] [] [] [] [] [] [] []    Functional Mobility [] [] [] [] [x] [] [] [] [] []    I=Independent, Mod I=Modified Independent, S=Supervision/Setup, SBA=Standby Assistance, CGA=Contact Guard Assistance, Min=Minimal Assistance, Mod=Moderate Assistance, Max=Maximal Assistance, Total=Total Assistance, NT=Not Tested    PLAN:     FREQUENCY/DURATION   OT Plan of Care: 1 time/week, 2 times/week for duration of hospital stay or until stated goals are met, whichever comes first.    ACUTE OCCUPATIONAL THERAPY GOALS:   (Developed with and agreed upon by patient and/or caregiver.)    GOALS:   DISCHARGE GOALS (in preparation for going home/rehab):  3 days  1. Ms. Radha Velasquez will perform lower body dressing activity with minimal assist required to demonstrate improved functional mobility and safety. Devin Shultz -GOAL MET 12/19/22     2. Ms. Radha Velasquez will perform bathing activity with minimal assist required to demonstrate improved functional mobility and safety.   3.  Ms. Radha Velasquez will perform toileting activity with  contact guard assist to demonstrate improved functional mobility and safety. -GOAL MET 12/19/22     4. Ms. Harrison Liu will perform all functional transfers transfer with contact guard assist to demonstrate improved functional mobility and safety. 5. Pt will be able to perform self care with stand by to minimal assistance and ambulate short distances with friend that is capable of assisting patient. -GOAL MET 12/19/22         PROBLEM LIST:   (Skilled intervention is medically necessary to address:)  Decreased ADL/Functional Activities  Decreased Activity Tolerance  Decreased Balance  Decreased Gait Ability  Decreased Safety Awareness  Decreased Strength  Decreased Transfer Abilities  Increased Pain   INTERVENTIONS PLANNED:   (Benefits and precautions of occupational therapy have been discussed with the patient.)  Self Care Training  Therapeutic Activity  Therapeutic Exercise/HEP  Neuromuscular Re-education  Education         TREATMENT:     EVALUATION: LOW COMPLEXITY: (Untimed Charge)    TREATMENT:   SELF CARE: (25 minutes):   Procedure(s) (per grid) utilized to improve and/or restore self-care/home management as related to dressing, bathing, toileting, grooming, self feeding, and functional mobility . Required minimal visual, verbal, manual, and tactile cueing to facilitate activities of daily living skills, compensatory activities, and Ot poc, wound vac education, post op ADL task performance and safety, DME recommendations .       AFTER TREATMENT PRECAUTIONS: Bed/Chair Locked, Call light within reach, Chair, Heels floated, Needs within reach, RN notified, and Visitors at bedside    INTERDISCIPLINARY COLLABORATION:  RN/ PCT, PT/ PTA, OT/ DAMIAN, and RN Case Manager/      EDUCATION:  Education Given To: Patient, Other (Comment) (friend present)  Education Provided: Plan of Care, Role of Therapy, Precautions, ADL Adaptive Strategies, Transfer Training, IADL Safety, Fall Prevention Strategies, Equipment, Family Education  Education Method: Demonstration, Verbal  Barriers to Learning: None  Education Outcome: Verbalized understanding, Demonstrated understanding  [x] Safe And Effective Hygiene  [x] Fall Precautions  [] Hip Precautions  [] D/C Instruction Review [] Prosthesis Review  [x] Walker Management/Safety  [x] Adaptive Equipment as Needed  [x] Therapeutic Resting Position of Joint       TOTAL TREATMENT DURATION AND TIME:  Time In: 4026  Time Out: [address] [place]  Minutes: Aníbal Hannon, OT

## 2022-12-19 NOTE — ANESTHESIA POSTPROCEDURE EVALUATION
Department of Anesthesiology  Postprocedure Note    Patient: Jorje Evangelista  MRN: [id number]  YOB: 1955  Date of evaluation: 12/19/2022      Procedure Summary     Date: 12/19/22 Room / Location: [facility] MAIN OR 05 / [facility] MAIN OR    Anesthesia Start: 0900 Anesthesia Stop: 1106    Procedure: Left KNEE TOTAL ARTHROPLASTY ROBOTIC/ SDD (Left: Knee) Diagnosis:       Primary osteoarthritis of left knee      Acquired deformity of left knee      (Primary osteoarthritis of left knee [M17.12])      (Acquired deformity of left knee [M21.962])    Surgeons: Felciity Ghosh MD Responsible Provider: Rosanna Carrero MD    Anesthesia Type: spinal ASA Status: 2          Anesthesia Type: No value filed.     Mathew Phase I: Mathew Score: 8    Mathew Phase II:        Anesthesia Post Evaluation    Patient location during evaluation: PACU  Patient participation: complete - patient participated  Level of consciousness: awake and alert  Pain score: 0  Airway patency: patent  Nausea & Vomiting: no nausea and no vomiting  Complications: no  Cardiovascular status: hemodynamically stable  Respiratory status: acceptable, nonlabored ventilation and spontaneous ventilation  Hydration status: euvolemic  Comments: /70   Pulse 92   Temp 98.1 °F (36.7 °C) (Tympanic)   Resp 16   Wt 225 lb 5 oz (102.2 kg)   SpO2 98%   BMI 38.67 kg/m²     Multimodal analgesia pain management approach

## 2022-12-19 NOTE — H&P
Patient ID:  Rae Linder  [id number]  46 y.o.  1955    Today: December 19, 2022          CC:  Left  Knee pain    HPI:   The patient has end stage arthritis of the left knee. The patient was evaluated and examined during consultation prior to today. The patient complains of knee pain with activities, reports pain as mostly occurring along the joint lines, reports stiffness of the knee after inactivity, and swelling/pain at the end of the day and after increased physical activity. The pain affects the patients activities of daily living and quality of life. The patient has attempted and exhausted conservative treatment. There have been no changes to the patient's orthopedic condition since the last office visit. Past Medical History:  Past Medical History:   Diagnosis Date    High cholesterol     managed with medication    Hypertension     managed with medication    Obesity        Past Surgical History:  Past Surgical History:   Procedure Laterality Date    CHOLECYSTECTOMY      GASTRIC BYPASS SURGERY      sleeve    HYSTERECTOMY (CERVIX STATUS UNKNOWN)      KNEE ARTHROSCOPY Bilateral     TONSILLECTOMY          Medications:     Prior to Admission medications    Medication Sig Start Date End Date Taking? Authorizing Provider   acetaminophen (TYLENOL) 500 MG tablet Take 2 tablets by mouth every 6 hours as needed for Pain 12/12/22   Dominik Rodriguez MD   zolpidem (AMBIEN) 10 MG tablet Take 0.5 tablets by mouth nightly as needed for Sleep for up to 30 days. Take 1/2-1 tablet as needed for insomnia postop. Start with 1/2 tab and if still unable to sleep after 1 hour can take the other half of the tablet.  12/12/22 1/11/23  Dominik Rodriguez MD   aspirin EC 81 MG EC tablet Take 1 tablet by mouth in the morning and at bedtime 12/12/22   Dominik Rodriguez MD   cyclobenzaprine (FLEXERIL) 5 MG tablet Take 1 tablet by mouth 3 times daily as needed for Muscle spasms 12/12/22 12/22/22  Dominik Rodriguez MD HYDROmorphone (DILAUDID) 2 MG tablet Take 1 tablet by mouth every 4 hours as needed for Pain for up to 7 days. 12/12/22 12/19/22  Felicity Ghosh MD   gabapentin (NEURONTIN) 300 MG capsule Take 1 capsule by mouth 2 times daily for 15 days. 12/12/22 12/27/22  Felicity Ghosh MD   meloxicam (MOBIC) 15 MG tablet Take 1 tablet by mouth daily 12/12/22   Felicity Ghosh MD   omeprazole (PRILOSEC) 40 MG delayed release capsule Take 1 capsule by mouth daily 12/12/22   Felicity Ghosh MD   ondansetron (ZOFRAN) 4 MG tablet Take 1 tablet by mouth 3 times daily as needed for Nausea or Vomiting 12/12/22   Felicity Ghosh MD   senna (SENOKOT) 8.6 MG tablet Take 1 tablet by mouth 2 times daily 12/12/22   Felicity Ghosh MD   ondansetron (ZOFRAN) 4 MG tablet Take 1 tablet by mouth 3 times daily as needed for Nausea or Vomiting 12/12/22   Felicity Ghosh MD   ZINC PO Take by mouth    Historical Provider, MD   lisinopril-hydroCHLOROthiazide (PRINZIDE;ZESTORETIC) 10-12.5 MG per tablet  5/3/22   Historical Provider, MD   rosuvastatin (CRESTOR) 10 MG tablet  5/3/22   Historical Provider, MD       Family History:   No family history on file. Social History:      Social History     Tobacco Use    Smoking status: Never    Smokeless tobacco: Never   Substance Use Topics    Alcohol use: Never         Allergies: Allergies   Allergen Reactions    Codeine      Other reaction(s): Nausea and/or vomiting-Intolerance  Passing out, tramadol ok        Vitals: There were no vitals taken for this visit. Objective:         General: No Acute distress                   HEENT: Normocephalic/atramatic                   Lungs:  Breathing non-labored                   Heart:   RRR                    Abdomen: soft       Extremities:  Prior exam done in office has been consistent with end-stage knee arthritis. The patient has noted pain with ROM of the left knee. Trace effusion. Crepitus   present.  Distally the patient shows no neurologic deficit. Antalgic gait appreciated. Meds:   No current facility-administered medications for this encounter. Current Outpatient Medications   Medication Sig    acetaminophen (TYLENOL) 500 MG tablet Take 2 tablets by mouth every 6 hours as needed for Pain    zolpidem (AMBIEN) 10 MG tablet Take 0.5 tablets by mouth nightly as needed for Sleep for up to 30 days. Take 1/2-1 tablet as needed for insomnia postop. Start with 1/2 tab and if still unable to sleep after 1 hour can take the other half of the tablet. aspirin EC 81 MG EC tablet Take 1 tablet by mouth in the morning and at bedtime    cyclobenzaprine (FLEXERIL) 5 MG tablet Take 1 tablet by mouth 3 times daily as needed for Muscle spasms    HYDROmorphone (DILAUDID) 2 MG tablet Take 1 tablet by mouth every 4 hours as needed for Pain for up to 7 days. gabapentin (NEURONTIN) 300 MG capsule Take 1 capsule by mouth 2 times daily for 15 days. meloxicam (MOBIC) 15 MG tablet Take 1 tablet by mouth daily    omeprazole (PRILOSEC) 40 MG delayed release capsule Take 1 capsule by mouth daily    ondansetron (ZOFRAN) 4 MG tablet Take 1 tablet by mouth 3 times daily as needed for Nausea or Vomiting    senna (SENOKOT) 8.6 MG tablet Take 1 tablet by mouth 2 times daily    ondansetron (ZOFRAN) 4 MG tablet Take 1 tablet by mouth 3 times daily as needed for Nausea or Vomiting    ZINC PO Take by mouth    lisinopril-hydroCHLOROthiazide (PRINZIDE;ZESTORETIC) 10-12.5 MG per tablet     rosuvastatin (CRESTOR) 10 MG tablet        Patient Active Problem List   Diagnosis    Primary osteoarthritis of left knee    Acquired deformity of left knee    Noncompliance with CPAP treatment       Assessment:   1. Arthritis of the Left knee    Plan:   The patient has failed previous conservative treatment for this condition including anti-inflammatories, injections and lifestyle modifications. The necessity for joint replacement is present.  Risks, benefits, alternatives and possible complications of left knee arthroplasty have been discussed with the patient including but not limited to potential for infection, bleeding, damage to nerves and/or blood vessels, stiffness of the knee after surgery, knee instability after surgery, patellar maltracking, potential for fracture both intra-op and post-op, polyethylene wear, implant loosening, and risk for revision surgery secondary to but not limited to these discussed risks. Further, we have discussed potential for venous thrombo-embolism including deep vein thrombosis and pulmonary embolism despite being on prophylaxis. Additionally, we have discussed potential for continued pain after surgery and patient has voiced understanding that I can make no guarantees regarding the pain relief of outcomes after surgery. We have also previously discussed the potential of morbidity and mortality associated with, but not limited to, the actual surgical procedure, anesthesia, prior medical conditions, and/or the administration of medications perioperatively. The patient has been given the opportunity to ask questions all of which have been answered and the patient wishes to proceed. The patient was counseled at length about the risks of syed Covid-19 during their perioperative period and any recovery window from their procedure. The patient was made aware that syed Covid-19  may worsen their prognosis for recovering from their procedure and lend to a higher morbidity and/or mortality risk. All material risks, benefits, and reasonable alternatives including postponing the procedure were discussed. The patient does  wish to proceed with the procedure at this time.        Signed By: Elonda Cushing, MD  December 19, 2022

## 2022-12-19 NOTE — PROGRESS NOTES
TRANSFER - IN REPORT:    Verbal report received from [address] [place] [address] [facility] for routine post-op      Report consisted of patient's Situation, Background, Assessment and   Recommendations(SBAR). Information from the following report(s) Surgery Report, Intake/Output, and MAR was reviewed with the receiving nurse. Opportunity for questions and clarification was provided.

## 2022-12-19 NOTE — PERIOP NOTE
Dr. Homero Apodaca at bedside for eval/ signout . TRANSFER - OUT REPORT:    Verbal report given to Pallavi gA RN  on Maylon Herb  being transferred to [phone] for routine progression of patient care       Report consisted of patient's Situation, Background, Assessment and   Recommendations(SBAR). Information from the following report(s) Nurse Handoff Report was reviewed with the receiving nurse. [place] Assessment: No data recorded  Lines:   Peripheral IV 12/19/22 Left Hand (Active)   Site Assessment Clean, dry & intact 12/19/22 1123   Line Status Infusing 12/19/22 [facility] Connections checked and tightened 12/19/22 1123   Phlebitis Assessment No symptoms 12/19/22 1123   Infiltration Assessment 0 12/19/22 1123   Alcohol Cap Used No 12/19/22 1123   Dressing Status Clean, dry & intact 12/19/22 1123   Dressing Type Transparent 12/19/22 1123        Opportunity for questions and clarification was provided.       Patient transported with:  [facility]

## 2022-12-19 NOTE — DISCHARGE INSTRUCTIONS
contact you within 48 hrs of discharge that you have chosen. If you have not received a call within this time frame please contact that provider you chose. You should be given this information before you leave the hospital.     You are at a risk for falls. Use the rolling walker when walking. Patients who have had a joint replacement should not drive if they are still taking narcotic pain mediation during the daytime hours. Most patients wean themselves off of pain medication within 2-5 weeks after surgery. When to Call the office    - If you have a temperature greater then 101  - Uncontrolled vomiting   - Loose control of your bladder or bowel function  - Are unable to bear any wieght   - Need a pain medication refill          Total Knee Replacement: What to Expect at  [address] had a total knee replacement. The doctor replaced the worn ends of the bones that connect to your knee (thighbone and lower leg bone) with plastic and metal parts. When you leave the hospital, you should be able to move around with a walker or crutches. But you will need someone to help you at home until you have more energy and can move around better. You will go home with a bandage and stitches, staples, skin glue, or tape strips. Change the bandage as your doctor tells you to. If you have stitches or staples, your doctor will remove them about 2 weeks after your surgery. Glue or tape strips will fall off on their own over time. You may still have some mild pain, and the area may be swollen for a few months after surgery. Your knee will continue to improve for up to a year. You will probably use a walker for some time after surgery. When you are ready, you can use a cane. You may be able to walk without support after a couple weeks, or when you are comfortable. You will need to do months of physical rehabilitation (rehab) after a knee replacement.  Rehab will help you strengthen the muscles of the knee and help you regain movement. After you recover, your artificial knee will allow you to do normal daily activities with less pain or no pain at all. You may be able to hike, dance, or ride a bike. Talk to your doctor about whether you can do more strenuous activities. Always tell your caregivers that you have an artificial knee. How long it will take to walk on your own, return to normal activities, and go back to work depends on your health and how well your rehabilitation (rehab) program goes. The better you do with your rehab exercises, the quicker you will get your strength and movement back. This care sheet gives you a general idea about how long it will take for you to recover. But each person recovers at a different pace. Follow the steps below to get better as quickly as possible. How can you care for yourself at home? Activity    Rest when you feel tired. You may take a nap, but don't stay in bed all day. When you sit, use a chair with arms. You can use the arms to help you stand up. Work with your physical therapist to find the best way to exercise. What you can do as your knee heals will depend on whether your new knee is cemented or uncemented. You may not be able to do certain things for a while if your new knee is uncemented. After your knee has healed enough, you can do more strenuous activities with caution. You can golf, but you may want to use a golf cart for some time. And don't wear shoes with spikes. You can bike on a flat road or on a stationary bike. Talk to your doctor before biking uphill. Your doctor may suggest that you stay away from activities that put stress on your knee. These include tennis, badminton, contact sports like football, jumping (such as in basketball), jogging, and running. Avoid activities where you might fall. Do not sit for more than 1 hour at a time. Get up and walk around for a while before you sit again.  If you must sit for a long time, prop up your leg with a chair or footstool. This will help you avoid swelling. Ask your doctor when you can drive again. It may take several weeks after knee replacement surgery before it's safe for you to drive. When you get into a car, sit on the edge of the seat. Then pull in your legs, and turn to face the front. You should be able to do many everyday activities 3 to 6 weeks after your surgery. You will probably need to take 4 to 16 weeks off from work. When you can go back to work depends on the type of work you do and how you feel. Ask your doctor when it is okay for you to have sex. For 12 weeks, do not lift anything heavier than 10 pounds and do not lift weights. Diet    By the time you leave the hospital, you should be eating your normal diet. If your stomach is upset, try bland, low-fat foods like plain rice, broiled chicken, toast, and yogurt. Your doctor may suggest that you take iron and vitamin supplements. Drink plenty of fluids (unless your doctor tells you not to). Eat healthy foods, and watch your portion sizes. Try to stay at your ideal weight. Too much weight puts more stress on your new knee. You may notice that your bowel movements are not regular right after your surgery. This is common. Try to avoid constipation and straining with bowel movements. Drinking enough fluids, taking a stool softener, and eating foods that are good sources of fiber can help you avoid constipation. If you have not had a bowel movement after a couple of days, talk to your doctor. Medicines    Your doctor will tell you if and when you can restart your medicines. You will also get instructions about taking any new medicines. If you stopped taking aspirin or some other blood thinner, your doctor will tell you when to start taking it again. Your doctor may give you a blood-thinning medicine to prevent blood clots.  If you take a blood thinner, be sure you get instructions about how to take your medicine safely. Blood thinners can cause serious bleeding problems. This medicine could be in pill form or as a shot (injection). If a shot is needed, your doctor will tell you how to do this. Be safe with medicines. Take pain medicines exactly as directed. If the doctor gave you a prescription medicine for pain, take it as prescribed. If you are not taking a prescription pain medicine, ask your doctor if you can take an over-the-counter medicine. Plan to take your pain medicine 30 minutes before exercises. It is easier to prevent pain before it starts than to stop it after it has started. If you think your pain medicine is making you sick to your stomach: Take your medicine after meals (unless your doctor has told you not to). Ask your doctor for a different pain medicine. If your doctor prescribed antibiotics, take them as directed. Do not stop taking them just because you feel better. You need to take the full course of antibiotics. Incision care    If your doctor told you how to care for your cut (incision), follow your doctor's instructions. You will have a dressing over the cut. A dressing helps the incision heal and protects it. Your doctor will tell you how to take care of this. If you did not get instructions, follow this general advice: If you have strips of tape on the cut the doctor made, leave the tape on for a week or until it falls off. If you have stitches or staples, your doctor will tell you when to come back to have them removed. If you have skin glue on the cut, leave it on until it falls off. Skin glue is also called skin adhesive or liquid stitches. Change the bandage every day. Wash the area daily with warm water, and pat it dry. Don't use hydrogen peroxide or alcohol. They can slow healing. You may cover the area with a gauze bandage if it oozes fluid or rubs against clothing. You may shower 24 to 48 hours after surgery. Pat the incision dry.  Don't swim or take a bath for the first 2 weeks, or until your doctor tells you it is okay. Exercise    Your rehab program will give you a number of exercises to do to help you get back your knee's range of motion and strength. Always do them as your therapist tells you. Ice    For pain and swelling, put ice or a cold pack on the area for 10 to 20 minutes at a time. Put a thin cloth between the ice and your skin. If your doctor recommended cold therapy using a portable machine, follow the instructions that came with the machine. Other instructions    Wear compression stockings if your doctor told you to. These may help to prevent blood clots. Your doctor will tell you how long you need to keep wearing the compression stockings. Carry a medical alert card that says you have an artificial joint. You have metal pieces in your knee. These may set off some airport metal detectors. Follow-up care is a key part of your treatment and safety. Be sure to make and go to all appointments, and call your doctor if you are having problems. It's also a good idea to know your test results and keep a list of the medicines you take. When should you call for help? Call 911 anytime you think you may need emergency care. For example, call if:    You passed out (lost consciousness). You have severe trouble breathing. You have sudden chest pain and shortness of breath, or you cough up blood. Call your doctor now or seek immediate medical care if:    You have signs of infection, such as: Increased pain, swelling, warmth, or redness. Red streaks leading from the incision. Pus draining from the incision. A fever. You have signs of a blood clot, such as:  Pain in your calf, back of the knee, thigh, or groin. Redness and swelling in your leg or groin. Your incision comes open and begins to bleed, or the bleeding increases. You have pain that does not get better after you take pain medicine.    Watch closely for changes in your health, and be sure to contact your doctor if:    You do not have a bowel movement after taking a laxative. Where can you learn more? Go to http://www.woods.com/ and enter T054 to learn more about \"Total Knee Replacement: What to Expect at Home. \"  Current as of: March 9, 2022               Content Version: 13.5  © 2006-2022 [employer]. Care instructions adapted under license by [facility] ([facility]). If you have questions about a medical condition or this instruction, always ask your healthcare professional. [place] [address] any warranty or liability for your use of this information. Information obtained by :  I understand that if any problems occur once I am at home I am to contact my physician. I understand and acknowledge receipt of the instructions indicated above.                                                                                                                                            Physician's or R.N.'s Signature                                                                  Date/Time                                                                                                                                              Patient or Representative Signature                                                          Date/Time

## 2022-12-19 NOTE — PROGRESS NOTES
ACUTE PHYSICAL THERAPY GOALS:   (Developed with and agreed upon by patient and/or caregiver.)  GOALS (1-4 days):  (1.)Ms. Kaylee Gonzalez will move from supine to sit and sit to supine  in bed with SUPERVISION. (2.)Ms. Kaylee Gonzalez will transfer from bed to chair and chair to bed with SUPERVISION using the least restrictive device. (3.)Ms. Kaylee Gonzalez will ambulate with SUPERVISION for 400 feet with the least restrictive device. (4.)Ms. Kaylee Gonzalez will ambulate up/down 1 steps with bilateral  railing or with RW with MINIMAL ASSIST. met  (5.)Ms. Kaylee Gonzalez will increase left knee ROM to 0-85°.  ________________________________________________________________________________________________        PHYSICAL THERAPY JOINT CAMP: TOTAL KNEE ARTHROPLASTY Initial Assessment and PM  (Link to Caseload Tracking: PT Visit Days : 1  Acknowledge Orders  Time In/Out  PT Charge Capture  Rehab Caseload Tracker  Episode   Dani Alba is a 78 y.o. female   PRIMARY DIAGNOSIS: Osteoarthritis of left knee, unspecified osteoarthritis type  Primary osteoarthritis of left knee [M17.12]  Acquired deformity of left knee [M21.962]  Osteoarthritis of left knee, unspecified osteoarthritis type [M17.12]  Procedure(s) (LRB):  Left KNEE TOTAL ARTHROPLASTY ROBOTIC/ SDD (Left)  Day of Surgery  Reason for Referral: Pain in Left Knee (M25.562)  Stiffness of Left Knee, Not elsewhere classified (M25.662)  Difficulty in walking, Not elsewhere classified (R26.2)  Outpatient in a bed: Payor: MEDICARE / Plan: MEDICARE PART A AND B / Product Type: *No Product type* /     REHAB RECOMMENDATIONS:   Recommendation to date pending progress:  Setting:  Home Health Therapy    Equipment:    3 in 1 Bedside Commode  Rolling Walker     RANGE OF MOTION:   Left Knee Flexion: L Knee Flexion (0-145): 83  Left Knee Extension: L Knee Extension (0): 1     GAIT: I Mod I S SBA CGA Min Mod Max Total  NT x2 Comments:   Level of Assistance [] [] [] [x] [] [] [] [] [] [] [] Weightbearing Status  Left Lower Extremity Weight Bearing: Weight Bearing As Tolerated    Distance  400 feet    Gait Quality Decreased rigo  and Decreased stance    DME Rolling Walker     Stairs Stairs/Curb  Stairs?: Yes  Stairs  # Steps : 3  Rails: Bilateral (also one step with RW)  Assistance: Contact guard assistance    Ramp     I=Independent, Mod I=Modified Independent, S=Supervision, SBA=Standby Assistance, CGA=Contact Guard Assistance,   Min=Minimal Assistance, Mod=Moderate Assistance, Max=Maximal Assistance, Total=Total Assistance, NT=Not Tested    ASSESSMENT:   ASSESSMENT:  Ms. Kizzy Sweeney presents with decreased strength and range of motion left lower extremity and with decreased independence with functional mobility s/p left total knee arthroplasty. Pt will benefit from skilled PT interventions to maximize independence with functional mobility and TKA management. Pt did well with assessment and functional mobility. Reviewed safety and importance of rom and swelling control and that she will have more pain and swelling at home as the block wears off. Today's treatment focused on transfer and gait training and step and did well. Pt practiced TKA exercises as below with verbal cues while reviewing HEP. Reviewed use of cryocuff as needed for pain and swelling. Pt instructed not to get up without assist. Pt plans to discharge to home today from the hospital with continued therapy for follow up. Answered questions. She was eager to go home today.   .     Outcome Measure:   [id number]       SUBJECTIVE:   Ms. Kizzy Sweeney states, \"good\"     Home Environment/Prior Level of Function Lives With: Parent  Type of Home: House  Home Layout: Two level, Able to Live on Main level with bedroom/bathroom  Home Access: Stairs to enter without rails  Entrance Stairs - Number of Steps: 1  Bathroom Shower/Tub: Walk-in shower, Tub/Shower unit  Home Equipment:  (none)    OBJECTIVE:     PAIN: VITAL SIGNS: LINES/DRAINS: Pre Treatment:         Post Treatment: knee pain did not rate Vitals        Oxygen    None    RESTRICTIONS/PRECAUTIONS:  Restrictions/Precautions: Weight Bearing  Left Lower Extremity Weight Bearing: Weight Bearing As Tolerated        Restrictions/Precautions: Weight Bearing        LOWER EXTREMITY GROSS EVALUATION:  RIGHT LE   Within Functional Limits   Abnormal   Comments   Strength [] []  Decreased functional   Range of Motion [] []  Decreased functional       LEFT LE Within Functional Limits   Abnormal   Comments   Strength [] []  Limited by surgery   Range of Motion [] [] AROM LLE (degrees)  L Knee Flexion (0-145): 83  L Knee Extension (0): 1     UPPER EXTREMITY GROSS EVALUATION:     Within Functional Limits   Abnormal   Comments   Strength [] []    Range of Motion [] []      SENSATION  Sensation [x]       COGNITION/  PERCEPTION: Intact Impaired (Comments):   Orientation [x]     Vision [x]     Hearing [x]     Cognition  [x]       MOBILITY: I Mod I S SBA CGA Min Mod Max Total  NT x2 Comments:   Bed Mobility    Rolling [] [] [] [] [] [] [] [] [] [] []    Supine to Sit [] [] [] [] [] [] [] [] [] [] [] In chair   Scooting [] [] [] [] [] [] [] [] [] [] []    Sit to Supine [] [] [] [] [] [] [] [] [] [] []    Transfers    Sit to Stand [] [] [] [x] [] [] [] [] [] [] []    Bed to Chair [] [] [] [x] [] [] [] [] [] [] []    Stand to Sit [] [] [] [x] [] [] [] [] [] [] []    Stand Pivot [] [] [] [] [] [] [] [] [] [] []    Toilet [] [] [] [] [] [] [] [] [] [] []     [] [] [] [] [] [] [] [] [] [] []    I=Independent, Mod I=Modified Independent, S=Supervision, SBA=Standby Assistance, CGA=Contact Guard Assistance,   Min=Minimal Assistance, Mod=Moderate Assistance, Max=Maximal Assistance, Total=Total Assistance, NT=Not Tested    BALANCE: Good Fair+ Fair Fair- Poor NT Comments   Sitting Static [x] [] [] [] [] []    Sitting Dynamic [x] [] [] [] [] []              Standing Static [x] [] [] [] [] [] With RW   Standing Dynamic [] [x] [] [] [] [] With RW     PLAN:   FREQUENCY AND DURATION: BID for duration of hospital stay or until stated goals are met, whichever comes first.    THERAPY PROGNOSIS: Good    PROBLEM LIST:   (Skilled intervention is medically necessary to address:)  Decreased ADL/Functional Activities, Decreased Activity Tolerance, Decreased AROM/PROM, Decreased Gait Ability, Decreased Strength, Decreased Transfer Abilities, and Increased Pain   INTERVENTIONS PLANNED:   (Benefits and precautions of physical therapy have been discussed with the patient.)  Therapeutic Activity, Therapeutic Exercise/HEP, Gait Training, Modalities, and Education       TREATMENT:   EVALUATION: LOW COMPLEXITY: (Untimed Charge)    TREATMENT:   Therapeutic Activity (30 Minutes): Therapeutic activity included Transfer Training, Ambulation on level ground, Stair Training, Sitting balance , Standing balance, and exercises to improve functional Activity tolerance, Balance, Coordination, Mobility, Strength, and ROM.     TREATMENT GRID:  THERAPEUTIC  EXERCISES: DATE:  12/19 DATE:   DATE:      AM PM AM PM AM PM    [] [] [] [] [] []   Ankle Pumps  10       Quad Sets  10       Gluteal Sets  10       Hip Abd/ADduction  10       Straight Leg Raises  10       Knee Slides  10aa       Short Arc Quads  10       Chair Slides  10                         B = bilateral; AA = active assistive; A = active; P = passive      EDUCATION: Education Given To: Patient, Family  Education Provided: Role of Therapy, Home Exercise Program, Transfer Training  Education Method: Demonstration, Verbal  Education Outcome: Verbalized understanding  EDUCATION:  [x] Home Exercises  [x] Fall Precautions  [x] No Pillow Under Knee  [x] D/C Instruction Review [x] Cryocuff  [x] Walker Management/Safety  [] Adaptive Equipment as Needed     AFTER TREATMENT PRECAUTIONS: Bed/Chair Locked, Call light within reach, Chair, Needs within reach, and Visitors at bedside    INTERDISCIPLINARY COLLABORATION:  RN/ PCT    COMPLIANCE WITH PROGRAM/EXERCISE: compliant most of the time. RECOMMENDATIONS/INTENT FOR NEXT TREATMENT SESSION: Treatment next visit will focus on increasing Ms. Carroll's independence with bed mobility, transfers, gait training, strength/ROM exercises, modalities for pain, and patient education.      TIME IN/OUT:  Time In: 1400  Time Out: 1440  Minutes: 3 Micky Munoz PT

## 2022-12-19 NOTE — ANESTHESIA PROCEDURE NOTES
Spinal Block    Patient location during procedure: OR  End time: 12/19/2022 9:18 AM  Reason for block: primary anesthetic  Staffing  Performed: anesthesiologist   Anesthesiologist: Evie Haney MD  Spinal Block  Patient position: sitting  Prep: ChloraPrep  Patient monitoring: cardiac monitor, continuous pulse ox and frequent blood pressure checks  Approach: midline  Location: L3/L4  Provider prep: mask and sterile gloves  Local infiltration: lidocaine  Needle  Needle type: Flory   Needle gauge: 22 G  Needle length: 3.5 in  Assessment  Events: SAB placement uncomplicated. No paresthesia. Swirl obtained: Yes  CSF: clear  Attempts: 2  Hemodynamics: stable  Additional Notes  Risks discussed including bleeding, infection, or damage to muscle/nerve. CSF aspirated before and after injection. No paresthesia. Patient tolerated well.   Preanesthetic Checklist  Completed: patient identified, IV checked, risks and benefits discussed, surgical/procedural consents, equipment checked, pre-op evaluation, timeout performed, anesthesia consent given, oxygen available and monitors applied/VS acknowledged
injection 0.2% - Perineural   20 mL - 12/19/2022 8:34:00 AM

## 2022-12-19 NOTE — OP NOTE
[id number] [facility]  Total Knee Arthroplasty  Patient: Shelley Corona   : 1955  Medical Record [id number]    Pre-operative Diagnosis: Primary osteoarthritis of left knee [M17.12]  Acquired deformity of left knee [M21.962]    Post-operative Diagnosis: Same    Location: [facility]    Date of Procedure: 2022    Surgeon: Sukhwinder Giles MD    Assistant: Eloy Gaspar CFA and Janay Lopez NP CFA    Anesthesia: Spinal + adductor nerve block    Procedure:  DILAN-Assisted Left Total Knee Arthroplasty    Tourniquet Time: Tourniquet Not Used    BMI: Body mass index is 38.67 kg/m². EBL: 122BW    Complications: None    Patient Condition upon Completion of Procedure: Stable    Implants:   Implant Name Type Inv. Item Serial No.  Lot No. LRB No. Used Action   COMPONENT FEM SZ 4 L KNEE CRUCE RET CEMENTLESS BEAD W/ GADIEL - [id number]  COMPONENT FEM SZ 4 L KNEE CRUCE RET CEMENTLESS BEAD W/ GADIEL  [employer] [id number] Left 1 Implanted   BASEPLATE TIB SZ 3 [id number] ML67MM KNEE TRITANIUM 4 CRUCFRM - [id number]  BASEPLATE TIB SZ 3 [id number] ML67MM KNEE TRITANIUM 4 CRUCFRM  [employer] [id number] Left 1 Implanted   INSERT TIB CS 3 12 MM ARTC POST KNEE BEAR TECHNOLOGY X3 - [id number]  INSERT TIB CS 3 12 MM ARTC POST KNEE BEAR TECHNOLOGY X3  [employer] [id number] Left 1 Implanted       Pre-Operative Plan/Implants:   - #4 Femoral Component   - #4 Tibial Component   - 9 mm Polyethylene Component    Intra-Operative Findings: Prior to bony resection we found that the patient's knee lacked approximately 5 degrees of extension. Also prior to bony resection we noted a neutral coronal alignment. Intra-operatively we noted that the articular surfaces were arthritic with cartilage loss of both the medial and lateral compartments. The patella was examined and found to be in good condition with minimal degenerative changes and was left unresurfaced. Joe Alanis With trial components in place we assessed knee motion and found that the knee was able to fully extend. In addition we felt we had achieved acceptable ligament balance between the medial and lateral side of the knee in both extension and flexion. Description of Procedure: The complexity of the total joint surgery requires the use of a first assistant. The above individual assisted with positioning, prepping, draping of the patient before the procedure and instrument manipulation, extremity repositioning and retraction during the procedure as well as wound closure, dressing application and brace application after the procedure (if applicable). No intern, resident, or other hospital staff was available to assist during the surgery. Joshua Koo had undergone adductor canal block in the preoperative holding area. Joshua Koo was brought to the operating room, positioned on the operating room table, and after appropriate identification underwent Spinal anesthesia. IV antibiotics were administered per proticol. The left leg was then prepped and draped in the usual sterile manner. Timeout was taken identifying the patient, procedure ,operative side and surgeon. Prior to incision one gram of IV transexamic acid was administered intravenously. An anterior longitudinal incision was made just medial to the tibial tubercle and extending proximal.  A subvastas capsular incision was performed. The medial capsular flap was elevated around to the midcoronal plane. The patella was subluxed laterally and patellar fat pat partially excised. The knee was flexed and externally rotated. The articular surface revealed cartilage loss with exposed bone and bone spurs throughout all three compartments. The anterior cruciate ligament was resected. We then placed both our femoral and tibial check points followed by the femoral and tibial array pins.  The femoral arrays pins were placed intra-incisional whereas the tibial pins were placed extra-incisional approximately 4-5cm below the tibial tubercle. Both arrays were placed and were verified to be visible to the DILAN sensory array. We then proceeded with point acquisition of both the femur and tibia. Finally, we captured stress views of the knee in extension and 90 degrees of flexion for our ligament balancing after medial and/or lateral osteophytes were removed. We then adjusted our planned femoral and tibial component placement parameters to obtain acceptable ligament balance while ensuring that we stayed within acceptable alignment criteria as well as resection depths/parameters for both the femur and tibia. Ultimately, we opted for a #4 femoral component, a #3 tibial component and a 11mm polyethylene component. Retractors were placed and we proceed with our bony preparation. We first addressed our distal femur and posterior chamfer cuts using the 90 degrees blade. We then performed our posterior condylar, anterior femoral, anterior chamfer, and proximal tibial cuts with the straight DILAN blade. Bony wedges were removed after these cuts as were any residual osteophytes. The PCL was assessed and felt to be intact and stable. We felt given this that we would be could proceed with a cruciate retaining implant. . Medial and lateral meniscus' were removed along with any redundant tissue. Any posterior osteophytes were removed. The posteromedial and posterolateral capsule as well as the medial and lateral periosteum of the distal femur and proximal tibia were injected with periarticular cocktail containing local anesthetic and toradol. Trial components were then placed. We then performed a trial of the knee with trial components in place.  We felt that with a 11 mm polyethylene trial in place the knee had acceptable varus and valgus stability throughout arc of motion, was able to fully extend, was not too tight in flexion, and had acceptable stability with anterior  Drawer testing. No additional surgical releases were required. The patella was then everted. The patella was examined and found to be in good condition with minimal degenerative changes and was left unresurfaced. At this point the trial polyethylene component and trial femoral component were removed. We again assessed our tibial tray and verified that we were satisfied with its position. We did not have to adjust its position. The proximal tibia was punched and drilled per protocol for the system. We irrigated and debrided all bony surfaces of residual tissue and bone. We then inserted the tibial and femoral components and noted them to be well seated. We then inserted our final polyethylene component which was a 11mm thick. Russ Carroll's knee was placed through a range of motion and noted to be stable as mentioned above with the trial components. In additional we checked patellar tracking one last time which was felt to be appropriate. A lavage of a combination of dilute betadine and Irrisept was allowed to soak in the wound after implanting of the prosthesis. During this time we removed the previously placed femoral and tibial sensors and array pins and finished injection our periarticular cocktail. The wound was irrigated with normal saline again before closure. Prior to capsular closure one gram of vancomycin powder and 1.2 grams of tobramycin powder was placed within the capsular layer. The capsular layer was closed using a combination of 0-Stratafix bidirectional barbed suture and #2 Fiberwire. One batch of Cellerate collagen powder was placed between the capsular layer closure and the subcutaneous layer closure. The subcutaneous layer was closed using a 2-0 Monocril interrupted suture. The skin was closed using staples. A sterile Prevena dressing was applied. The sponge count and needle counts were correct.  Patient was transferred to the hospital stretcher and transported to recovery in stable condition.     Signed By: Priyank Collins MD

## 2022-12-19 NOTE — ANESTHESIA PRE PROCEDURE
Department of Anesthesiology  Preprocedure Note       Name:  Bryanna José   Age:  78 y.o.  :  1955                                          MRN:  [id number]         Date:  2022      Surgeon: Sangita Botello):  José Benton MD    Procedure: Procedure(s):  Left KNEE TOTAL ARTHROPLASTY ROBOTIC/ SDD    Medications prior to admission:   Prior to Admission medications    Medication Sig Start Date End Date Taking? Authorizing Provider   acetaminophen (TYLENOL) 500 MG tablet Take 2 tablets by mouth every 6 hours as needed for Pain 22   José Benton MD   zolpidem (AMBIEN) 10 MG tablet Take 0.5 tablets by mouth nightly as needed for Sleep for up to 30 days. Take 1/2-1 tablet as needed for insomnia postop. Start with 1/2 tab and if still unable to sleep after 1 hour can take the other half of the tablet. Patient not taking: Reported on 2022  José Benton MD   aspirin EC 81 MG EC tablet Take 1 tablet by mouth in the morning and at bedtime  Patient not taking: Reported on 2022   José Benton MD   cyclobenzaprine (FLEXERIL) 5 MG tablet Take 1 tablet by mouth 3 times daily as needed for Muscle spasms  Patient not taking: Reported on 2022  José Benton MD   HYDROmorphone (DILAUDID) 2 MG tablet Take 1 tablet by mouth every 4 hours as needed for Pain for up to 7 days. Patient not taking: Reported on 2022  José Benton MD   gabapentin (NEURONTIN) 300 MG capsule Take 1 capsule by mouth 2 times daily for 15 days.   Patient not taking: Reported on 2022  José Benton MD   meloxicam HUE JACKSON [facility]) 15 MG tablet Take 1 tablet by mouth daily  Patient not taking: Reported on 2022   José Benton MD   omeprazole (PRILOSEC) 40 MG delayed release capsule Take 1 capsule by mouth daily  Patient not taking: Reported on 2022   MD jack Kam (SENOKOT) 8.6 MG tablet Take 1 tablet by mouth 2 times daily  Patient not taking: Reported on 12/19/2022 12/12/22   Sheryl Crandall MD   ondansetron (ZOFRAN) 4 MG tablet Take 1 tablet by mouth 3 times daily as needed for Nausea or Vomiting  Patient not taking: Reported on 12/19/2022 12/12/22   Sheryl Crandall MD   ZINC PO Take by mouth    Historical Provider, MD   lisinopril-hydroCHLOROthiazide (PRINZIDE;ZESTORETIC) 10-12.5 MG per tablet  5/3/22   Historical Provider, MD   rosuvastatin (CRESTOR) 10 MG tablet  5/3/22   Historical Provider, MD       Current medications:    Current Facility-Administered Medications   Medication Dose Route Frequency Provider Last Rate Last Admin    lidocaine 1 % injection 1 mL  1 mL IntraDERmal Once PRN Enio Burroughs MD        acetaminophen (TYLENOL) tablet 1,000 mg  1,000 mg Oral Once Enio Burroughs MD        fentaNYL (SUBLIMAZE) injection 100 mcg  100 mcg IntraVENous Q1H PRN Enio Burroughs MD        Or    fentaNYL (SUBLIMAZE) injection 25 mcg  25 mcg IntraVENous Q1H PRN Enio Burroughs MD        lactated ringers infusion   IntraVENous Continuous Enio Burroughs MD        sodium chloride flush 0.9 % injection 5-40 mL  5-40 mL IntraVENous 2 times per day Enio Burroughs MD        sodium chloride flush 0.9 % injection 5-40 mL  5-40 mL IntraVENous PRN Enio Burroughs MD        0.9 % sodium chloride infusion   IntraVENous PRN Enio Burroughs MD        midazolam PF (VERSED) injection 2 mg  2 mg IntraVENous Once PRN Enio Burroughs MD        celecoxib (CELEBREX) capsule 100 mg  100 mg Oral Once Enio Burroughs MD        ceFAZolin (ANCEF) 2000 mg in sterile water 20 mL IV syringe  2,000 mg IntraVENous On Call to [address] [place] [address], APRN - CNP        sodium chloride flush 0.9 % injection 5-40 mL  5-40 mL IntraVENous 2 times per day ROBYN Modi CNP        sodium chloride flush 0.9 % injection 5-40 mL  5-40 mL IntraVENous PRN ROBYN Modi CNP        0.9 % sodium chloride infusion   IntraVENous PRN Luverne Peon, APRN - CNP           Allergies: Allergies   Allergen Reactions    Codeine      Other reaction(s): Nausea and/or vomiting-Intolerance  Passing out, tramadol ok       Problem List:    Patient Active Problem List   Diagnosis Code    Primary osteoarthritis of left knee M17.12    Acquired deformity of left knee M21.962    Noncompliance with CPAP treatment Z91.14    Osteoarthritis of left knee, unspecified osteoarthritis type M17.12       Past Medical History:        Diagnosis Date    High cholesterol     managed with medication    Hypertension     managed with medication    Obesity        Past Surgical History:        Procedure Laterality Date    CHOLECYSTECTOMY      GASTRIC BYPASS SURGERY      sleeve    HYSTERECTOMY (CERVIX STATUS UNKNOWN)      KNEE ARTHROSCOPY Bilateral     TONSILLECTOMY         Social History:    Social History     Tobacco Use    Smoking status: Never    Smokeless tobacco: Never   Substance Use Topics    Alcohol use: Never                                Counseling given: Not Answered      Vital Signs (Current):   Vitals:    12/19/22 0717   BP: (!) 146/92   Pulse: 88   Resp: 16   SpO2: 98%   Weight: 225 lb 5 oz (102.2 kg)                                              BP Readings from Last 3 Encounters:   12/19/22 (!) 146/92   11/28/22 (!) 155/69       NPO Status: Time of last liquid consumption: 0600                        Time of last solid consumption: 2100                        Date of last liquid consumption: 12/19/22                        Date of last solid food consumption: 12/18/22    BMI:   Wt Readings from Last 3 Encounters:   12/19/22 225 lb 5 oz (102.2 kg)   11/28/22 224 lb (101.6 kg)   06/16/22 232 lb 3.2 oz (105.3 kg)     Body mass index is 38.67 kg/m².     CBC:   Lab Results   Component Value Date/Time    WBC 6.5 11/28/2022 12:30 PM    RBC 4.88 11/28/2022 12:30 PM    HGB 14.4 11/28/2022 12:30 PM    HCT 46.0 11/28/2022 12:30 PM    MCV 94.3 11/28/2022 12:30 PM    RDW 14.5 11/28/2022 12:30 PM     11/28/2022 12:30 PM       CMP:   Lab Results   Component Value Date/Time     11/28/2022 12:30 PM    K 4.3 11/28/2022 12:30 PM     11/28/2022 12:30 PM    CO2 32 11/28/2022 12:30 PM    BUN 17 11/28/2022 12:30 PM    CREATININE 0.68 11/28/2022 12:30 PM    LABGLOM >60 11/28/2022 12:30 PM    GLUCOSE 95 11/28/2022 12:30 PM    CALCIUM 10.1 11/28/2022 12:30 PM       POC Tests: No results for input(s): POCGLU, POCNA, POCK, POCCL, POCBUN, POCHEMO, POCHCT in the last 72 hours. Coags:   Lab Results   Component Value Date/Time    PROTIME 13.6 11/28/2022 12:30 PM    INR 1.0 11/28/2022 12:30 PM    APTT 32.0 11/28/2022 12:30 PM       HCG (If Applicable): No results found for: PREGTESTUR, PREGSERUM, HCG, HCGQUANT     ABGs: No results found for: PHART, PO2ART, QUJ5UPH, MPN6MCG, BEART, O8GJURVL     Type & Screen (If Applicable):  No results found for: LABABO, LABRH    Drug/Infectious Status (If Applicable):  No results found for: HIV, HEPCAB    COVID-19 Screening (If Applicable): No results found for: COVID19        Anesthesia Evaluation  Patient summary reviewed and Nursing notes reviewed  Airway: Mallampati: IV  TM distance: <3 FB   Neck ROM: limited  Mouth opening: < 3 FB   Dental: normal exam         Pulmonary:Negative Pulmonary ROS breath sounds clear to auscultation                             Cardiovascular:Negative CV ROS  Exercise tolerance: good (>4 METS),   (+) hypertension:, hyperlipidemia        Rhythm: regular  Rate: normal                    Neuro/Psych:   Negative Neuro/Psych ROS              GI/Hepatic/Renal: Neg GI/Hepatic/Renal ROS            Endo/Other: Negative Endo/Other ROS   (+) : arthritis: OA., .                 Abdominal:             Vascular: negative vascular ROS.          Other Findings:           Anesthesia Plan      spinal     ASA 2     (Airway exam is concerning for potentially difficult airway)      MIPS: Postoperative opioids intended and Prophylactic antiemetics administered. Anesthetic plan and risks discussed with patient. Plan discussed with CRNA.           Post-op pain plan if not by surgeon: single peripheral nerve block            Laurel Marsh MD   12/19/2022

## 2022-12-20 ENCOUNTER — TELEPHONE (OUTPATIENT)
Dept: ORTHOPEDICS UNIT | Age: 67
End: 2022-12-20

## 2022-12-20 ENCOUNTER — HOME CARE VISIT (OUTPATIENT)
Dept: SCHEDULING | Facility: HOME HEALTH | Age: 67
End: 2022-12-20

## 2022-12-20 VITALS
TEMPERATURE: 97.3 F | OXYGEN SATURATION: 96 % | RESPIRATION RATE: 14 BRPM | HEART RATE: 88 BPM | SYSTOLIC BLOOD PRESSURE: 104 MMHG | DIASTOLIC BLOOD PRESSURE: 62 MMHG

## 2022-12-20 PROCEDURE — G0151 HHCP-SERV OF PT,EA 15 MIN: HCPCS

## 2022-12-20 PROCEDURE — 0221000100 HH NO PAY CLAIM PROCEDURE

## 2022-12-20 ASSESSMENT — ENCOUNTER SYMPTOMS: PAIN LOCATION - PAIN QUALITY: ACHES, SORE

## 2022-12-20 NOTE — Clinical Note
SITUATION. 78year old female patient s/p L TKA 12/29/22 per Dr Dieudonne Delcid . Hospitalized 12/19/22. History of OA, HTN, high cholesterol, obesity, cholecystectomy, gastric bypass surgery, hysterectomy, B knee arthroscopy   BACKGROUND. .Lives with parents. Home is multi level with no steps to enter. Patient has a RW.   ASSESSMENT. Harden Beech Harden Beech Patient alert and oriented with good participation during visit. Pt presents with L TKA with wound vac dressing in place and intact with no drainage saturating through and no signs or symptoms of infection. Treatment: Instructed patient in infection control, taking medication as directed, use and application of CP, and signs and symptoms of DVT.  Instructed supine and seated TKA HEP protocol 20xeach of AP, QS, GS, hip abd/add, HS, SAQ  Frequency: 2w3, 1w1  Follow up: TKA protocol

## 2022-12-20 NOTE — CARE COORDINATION
Patient is a 78y.o. year old female admitted for Left TKA . Patient plans to return home on discharge. Order received to arrange home health. Patient without preference towards agency. Referral sent to [facility]. Patient requesting we arrange a walker and bedside commode. Referral sent to [address] [place], [place] [address] delivered to the hospital room prior to discharge. Will follow until discharge. 12/19/22 2018   Service Assessment   Patient Orientation Alert and Oriented   Cognition Alert   History Provided By Patient   Primary Caregiver Self   Support Systems Spouse/Significant Other   Patient's Healthcare Decision Maker is: Named in [address] [place] [address]   PCP Verified by CM Yes   Services At/After Discharge   Transition of Care Consult (CM Consult) [address] [facility] Discharge Home Health;PT   Mode of Transport at Discharge Self   Condition of Participation: Discharge Planning   The Plan for Transition of Care is related to the following treatment goals: improve mobility   The Patient and/or Patient Representative was provided with a Choice of Provider? Patient   The Patient and/Or Patient Representative agree with the Discharge Plan? Yes   Freedom of Choice list was provided with basic dialogue that supports the patient's individualized plan of care/goals, treatment preferences, and shares the quality data associated with the providers?   Yes

## 2022-12-20 NOTE — HOME HEALTH
SITUATION. 78year old female patient s/p L TKA 12/29/22 per Dr Daya Kirkland . Hospitalized 12/19/22. History of OA, HTN, high cholesterol, obesity, cholecystectomy, gastric bypass surgery, hysterectomy, B knee arthroscopy   BACKGROUND. .Lives with parents. Home is multi level with no steps to enter. Patient has a RW.   ASSESSMENT. Sergey Rincon Brock Patient alert and oriented with good participation during visit. Pt presents with L TKA with wound vac dressing in place and intact with no drainage saturating through and no signs or symptoms of infection. Treatment: Instructed patient in infection control, taking medication as directed, use and application of CP, and signs and symptoms of DVT.  Instructed supine and seated TKA HEP protocol 20xeach of AP, QS, GS, hip abd/add, HS, SAQ  Frequency: 2w3, 1w1  Follow up: TKA protocol

## 2022-12-20 NOTE — TELEPHONE ENCOUNTER
Called patient to follow up after TKA with SDD on 12/19/22. Reports \" I'm doing very well this morning. \"  Post op pain is managed. [facility] SOC is scheduled for today. Reviewed purpose of post op medications. Reviewed importance of ambulating at least every 45 minutes to an hour during the day. Advised regarding expected increase in post op pain and swelling around POD # 3. Reviewed miralax protocol. Reviewed cool jet ice machine protocol. Reviewed contact number for ortho navigator.

## 2022-12-23 ENCOUNTER — HOME CARE VISIT (OUTPATIENT)
Dept: HOME HEALTH SERVICES | Facility: HOME HEALTH | Age: 67
End: 2022-12-23

## 2022-12-23 PROCEDURE — G0151 HHCP-SERV OF PT,EA 15 MIN: HCPCS

## 2022-12-24 VITALS
RESPIRATION RATE: 16 BRPM | TEMPERATURE: 98.6 F | HEART RATE: 90 BPM | SYSTOLIC BLOOD PRESSURE: 108 MMHG | OXYGEN SATURATION: 99 % | DIASTOLIC BLOOD PRESSURE: 72 MMHG

## 2022-12-26 ENCOUNTER — HOME CARE VISIT (OUTPATIENT)
Dept: SCHEDULING | Facility: HOME HEALTH | Age: 67
End: 2022-12-26
Payer: MEDICARE

## 2022-12-26 VITALS
RESPIRATION RATE: 16 BRPM | SYSTOLIC BLOOD PRESSURE: 135 MMHG | HEART RATE: 100 BPM | DIASTOLIC BLOOD PRESSURE: 82 MMHG | OXYGEN SATURATION: 95 % | TEMPERATURE: 97.9 F

## 2022-12-26 PROCEDURE — G0151 HHCP-SERV OF PT,EA 15 MIN: HCPCS

## 2022-12-26 ASSESSMENT — ENCOUNTER SYMPTOMS: PAIN LOCATION - PAIN QUALITY: ACHY

## 2022-12-28 RX ORDER — GABAPENTIN 300 MG/1
CAPSULE ORAL
Qty: 30 CAPSULE | Refills: 0 | OUTPATIENT
Start: 2022-12-28

## 2022-12-29 ENCOUNTER — HOME CARE VISIT (OUTPATIENT)
Dept: SCHEDULING | Facility: HOME HEALTH | Age: 67
End: 2022-12-29
Payer: MEDICARE

## 2022-12-29 VITALS
DIASTOLIC BLOOD PRESSURE: 68 MMHG | RESPIRATION RATE: 16 BRPM | SYSTOLIC BLOOD PRESSURE: 118 MMHG | OXYGEN SATURATION: 97 % | TEMPERATURE: 97 F | HEART RATE: 100 BPM

## 2022-12-29 PROCEDURE — G0157 HHC PT ASSISTANT EA 15: HCPCS

## 2022-12-29 ASSESSMENT — ENCOUNTER SYMPTOMS: PAIN LOCATION - PAIN QUALITY: SORE. ACHES

## 2023-01-02 ENCOUNTER — HOME CARE VISIT (OUTPATIENT)
Dept: SCHEDULING | Facility: HOME HEALTH | Age: 68
End: 2023-01-02
Payer: MEDICARE

## 2023-01-02 PROCEDURE — G0157 HHC PT ASSISTANT EA 15: HCPCS

## 2023-01-03 ENCOUNTER — HOME CARE VISIT (OUTPATIENT)
Dept: HOME HEALTH SERVICES | Facility: HOME HEALTH | Age: 68
End: 2023-01-03
Payer: MEDICARE

## 2023-01-03 VITALS
RESPIRATION RATE: 16 BRPM | HEART RATE: 94 BPM | SYSTOLIC BLOOD PRESSURE: 122 MMHG | TEMPERATURE: 97.5 F | DIASTOLIC BLOOD PRESSURE: 78 MMHG | OXYGEN SATURATION: 98 %

## 2023-01-03 DIAGNOSIS — Z96.652 STATUS POST LEFT KNEE REPLACEMENT: Primary | ICD-10-CM

## 2023-01-03 ASSESSMENT — ENCOUNTER SYMPTOMS: PAIN LOCATION - PAIN QUALITY: ACHES

## 2023-01-05 ENCOUNTER — HOME CARE VISIT (OUTPATIENT)
Dept: SCHEDULING | Facility: HOME HEALTH | Age: 68
End: 2023-01-05
Payer: MEDICARE

## 2023-01-05 VITALS
DIASTOLIC BLOOD PRESSURE: 68 MMHG | HEART RATE: 88 BPM | SYSTOLIC BLOOD PRESSURE: 118 MMHG | TEMPERATURE: 98.1 F | OXYGEN SATURATION: 98 % | RESPIRATION RATE: 17 BRPM

## 2023-01-05 PROCEDURE — G0157 HHC PT ASSISTANT EA 15: HCPCS

## 2023-01-09 ENCOUNTER — HOME CARE VISIT (OUTPATIENT)
Dept: SCHEDULING | Facility: HOME HEALTH | Age: 68
End: 2023-01-09
Payer: MEDICARE

## 2023-01-09 VITALS
HEART RATE: 108 BPM | OXYGEN SATURATION: 98 % | SYSTOLIC BLOOD PRESSURE: 123 MMHG | TEMPERATURE: 97.6 F | RESPIRATION RATE: 17 BRPM | DIASTOLIC BLOOD PRESSURE: 76 MMHG

## 2023-01-09 PROCEDURE — G0151 HHCP-SERV OF PT,EA 15 MIN: HCPCS

## 2023-01-09 ASSESSMENT — ENCOUNTER SYMPTOMS: PAIN LOCATION - PAIN QUALITY: SORE

## 2023-01-10 ENCOUNTER — OFFICE VISIT (OUTPATIENT)
Dept: ORTHOPEDIC SURGERY | Age: 68
End: 2023-01-10

## 2023-01-10 DIAGNOSIS — Z09 FOLLOW-UP EXAMINATION FOLLOWING SURGERY: ICD-10-CM

## 2023-01-10 DIAGNOSIS — Z96.652 STATUS POST LEFT KNEE REPLACEMENT: Primary | ICD-10-CM

## 2023-01-10 RX ORDER — AMOXICILLIN 500 MG/1
TABLET, FILM COATED ORAL
Qty: 12 TABLET | Refills: 1 | Status: SHIPPED | OUTPATIENT
Start: 2023-01-10

## 2023-01-10 NOTE — PROGRESS NOTES
Patient ID:  Bryanna José  [id number]  9 y.o.  1955    Today: January 10, 2023    CHIEF COMPLAINT:  Follow-up left total knee replacement    HISTORY:  The patient presents today for 3-week follow-up after knee replacement. The patient is doing very well, is on aspirin for DVT prophylaxis. The patient is working with physical therapy to regain some strength and motion. Continues to take medication appropriately. The patient has done a good job keeping dressing/wound clean and dry. The patient is progressing nicely postoperatively. PE:  Incision is examined which is well healed. No erythema, induration or drainage. No significant fluid accumulation around the surgical site. ROM is 0 to 110 degrees. Overall the knee appears stable to varus/valgus stress throughout arc of motion. Anterior drawer testing at 45 and 90º of flexion is stable. Posterior drawer testing is stable. Distally able to plantar and dorsiflex foot and ankle. Sensation intact. Limb is perfused. No sign of DVT. X-RAYS:    XR LT Knee 2/3 view  Views Obtained: AP, Lateral and Sunrise views of the left knee  Indication: Postop Left TKA  Findings: All hardware to be intact. All the components appear to be well sized without any evidence of loosening. Overall mechanical alignment appears to be within acceptable criteria. No evidence of fracture. Patella appears to be tracking appropriately within the trochlear groove. Impression: Normal Xray after left total knee replacement    RONAL OTERO, ROBYN - CNP    ASSESSMENT:  3 Weeks S/P Left Total Knee Replacement    PLAN:  Continue activity and weight bearing as tolerated. Continue PT/OT to focus on strengthening and stretching. Continue to take pain medication appropriately. The patient will continue to take aspirin for another week for a full month of DVT prophylaxis.   The patient is given a script for antibiotics to be taken before dental prophylaxis today.  Will continue to work with PT/OT transitioning from home health PT to outpatient physical therapy. I have asked the patient not to submerge the incision under water for another couple of weeks and refrain from placing any creams or oils over the incision. If needed they are given a prescription for a refill on their pain medication today. I will plan to check them back in 3 months for a routine 4 month follow-up. Patient would like to proceed with right TKA.       Signed By: ROBYN Duque - LORA  January 10, 2023

## 2023-01-11 ENCOUNTER — HOSPITAL ENCOUNTER (OUTPATIENT)
Dept: PHYSICAL THERAPY | Age: 68
Setting detail: RECURRING SERIES
Discharge: HOME | End: 2023-01-14
Payer: MEDICARE

## 2023-01-11 PROCEDURE — 97161 PT EVAL LOW COMPLEX 20 MIN: CPT

## 2023-01-11 PROCEDURE — 97140 MANUAL THERAPY 1/> REGIONS: CPT

## 2023-01-11 PROCEDURE — 97110 THERAPEUTIC EXERCISES: CPT

## 2023-01-11 NOTE — THERAPY EVALUATION
Lorri Hernández  : 1955  Primary: Medicare Part A And B (Medicare)  Secondary: Carter Molina @ [facility]  [address] [place] [address]  Phone: [phone]  Fax: [phone] Plan Frequency: 2x/week for 90 days  Plan of Care/Certification Expiration Date: 23    PT Visit Info:    Plan Frequency: 2x/week for 90 days  Plan of Care/Certification Expiration Date: 23    Visit Count:  1                OUTPATIENT PHYSICAL THERAPY:             OP NOTE TYPE: Initial Assessment 2023               Episode  Appt Desk         Treatment Diagnosis:  Pain in Left Knee (M25.562)  Stiffness of Left Knee, Not elsewhere classified (M25.662)  Difficulty in walking, Not elsewhere classified (R26.2)  Medical/Referring Diagnosis:  Status post left knee replacement [O17.005]  Referring Physician:  Evaristo Conklin MD MD Orders:  PT Eval and Treat   Return MD Appt:    Date of Onset:  Onset Date: 22    Allergies:  Codeine  Restrictions/Precautions:    Restrictions/Precautions: Weight Bearing  Left Lower Extremity Weight Bearing: Weight Bearing As Tolerated     Medications Last Reviewed:  2023     SUBJECTIVE   History of Injury/Illness (Reason for Referral):  Pt had a L TKA on 2022. Is doing very well, not having any pain, has high function. Wants her legs to be stronger  Patient Stated Goal(s):  \"Make my legs stronger\"  Initial:      /10 Post Session:      /10  Past Medical History/Comorbidities:   Ms. Lisa Joseph  has a past medical history of High cholesterol, Hypertension, and Obesity. Ms. Lisa Joseph  has a past surgical history that includes Knee arthroscopy (Bilateral); Tonsillectomy; Hysterectomy; Cholecystectomy; Gastric bypass surgery; and Total knee arthroplasty (Left, 2022). Social History/Living Environment:   Lives With: Parent  Type of Home: House  Home Layout: Two level;  Able to Live on Main level with bedroom/bathroom  Home Access: Stairs to enter without rails  Entrance Stairs - Number of Steps: 1  Bathroom Shower/Tub: Walk-in shower; Tub/Shower unit  Home Equipment: -- (none)     Prior Level of Function/Work/Activity:   Prior level of function: Hiking, walking  Prior level of function: Independent  Occupation: Retired           Learning:   Does the patient/guardian have any barriers to learning?: No barriers  Will there be a co-learner?: No  What is the preferred language of the patient/guardian?: English  Is an  required?: No  How does the patient/guardian prefer to learn new concepts?: Listening; Reading; Demonstration; Pictures/Videos     Fall Risk Scale: Hidalgo Total Score: 54  Hidalgo Fall Risk: High (45 and higher)     Personal Factors:        Sex:  female        Age:  78 y.o.      OBJECTIVE   Observation  Incision: unable to see  Gait: normal  Edema       -Right:  minimal       -Left: moderate, whole LE lymphedema like swelling    ROM   Right Left   Flexion 125 111   Extension 0 0     Strength (all MMT scores are graded on a scale of 0-5)   Right Left   Hip      Flexion 5 5   Abduction 4 4   Extension 4 4   Glute Max 4 4   Knee     Extension 5 5   Flexion 5 5       Joint/Soft Tissue Mobility   Description   Joint Mobility Knee: hypomobile   Soft Tissue Mobility TTP in quad       Functional Mobility  30sec Sit to Stand: 12x      ASSESSMENT   Initial Assessment:  Beth Solitario presents to physical therapy with MD diagnosis of a s/p L TKA. Pt demonstrated signs and symptoms consistent with this diagnosis. On objective examination, the patient demonstrated deficits in ROM, strength, joint mobility, soft tissue mobility, functional ability, functional mobility, ambulatory ability, and balance. The patient also had increased pain, edema. The patient is limited in the following activities: walking, standing, sitting, transfers, bending, lifting, stairs, ADLs, functional tasks.  The patient has a good prognosis for recovery based on the examination findings and may be limited by: N/A. Patient requires skilled physical therapy services in order to return to prior level of function. Problem List: (Impacting functional limitations): Body Structures, Functions, Activity Limitations Requiring Skilled Therapeutic Intervention: Decreased functional mobility ; Decreased ADL status; Decreased ROM; Decreased body mechanics; Decreased strength; Decreased endurance; Decreased balance; Decreased high-level IADLs; Increased pain; Decreased posture     Therapy Prognosis:   Therapy Prognosis: Good     Initial Assessment Complexity:   Decision Making: Low Complexity    PLAN   Effective Dates: 01/11/23 TO Plan of Care/Certification Expiration Date: 04/11/23   Frequency/Duration: Plan Frequency: 2x/week for 90 days   Interventions Planned (Treatment may consist of any combination of the following):    Current Treatment Recommendations: Strengthening; ROM; Balance training; Functional mobility training; ADL/Self-care training; Stair training; Neuromuscular re-education; Manual; Pain management; Home exercise program; Safety education & training; Patient/Caregiver education & training; Positioning; Dry needling; Aquatics; Therapeutic activities     Goals: (Goals have been discussed and agreed upon with patient.)  Discharge Goals: Time Frame: 5 weeks  Pt will be compliant with progressive HEP  Pt will have > 120deg flexion  Pt will be able to walk 20min with pain < 2/10         Outcome Measure: Tool Used: Knee injury and Osteoarthritis Outcome Score for Joint Replacement (KOOS, JR)  Score:  Initial: 2/28 Most Recent: XX/28   Interpretation of Score: The KOOS, JR contains 7 items from the original KOOS survey. Items are coded from 0 to 4, none to extreme respectively. Robel Mayer is scored by summing the raw response (range 0-28) and then converting it to an interval score using the table provided below.  The interval score ranges from 0 to 100 where 0 represents total knee disability and 100 represents perfect knee health. Medical Necessity:   > Patient is expected to demonstrate progress in strength, range of motion, balance, coordination, and functional technique to increase independence with ADLs and functional tasks. Reason For Services/Other Comments:  > Patient continues to require skilled intervention due to return to prior level of function. Total Duration:       Regarding Edwin Carroll's therapy, I certify that the treatment plan above will be carried out by a therapist or under their direction.   Thank you for this referral,  Gregory Lloyd, PT     Referring Physician Signature: Alyson Cote MD                    Post Session Pain  Charge Capture  PT Visit Info MD Guidelines  MyChart

## 2023-01-11 NOTE — PROGRESS NOTES
Joshua Care  : 1955  Primary: Medicare Part A And B (Medicare)  Secondary: Carter Molina @ [facility]  [address] [facility] [address]  Phone: [phone]  Fax: [phone] Plan Frequency: 2x/week for 90 days    Plan of Care/Certification Expiration Date: 23      PT Visit Info:  Plan Frequency: 2x/week for 90 days  Plan of Care/Certification Expiration Date: 23      Visit Count:  1    OUTPATIENT PHYSICAL THERAPY:OP NOTE TYPE: Treatment Note 2023       Episode  }Appt Desk             Treatment Diagnosis:  Pain in Left Knee (M25.562)  Stiffness of Left Knee, Not elsewhere classified (M25.662)  Difficulty in walking, Not elsewhere classified (R26.2)  Medical/Referring Diagnosis:  Status post left knee replacement [J18.405]  Referring Physician:  Oneil Gill MD MD Orders:  PT Eval and Treat   Date of Onset:  Onset Date: 22     Allergies:   Codeine  Restrictions/Precautions:  Restrictions/Precautions: Weight Bearing  Left Lower Extremity Weight Bearing: Weight Bearing As Tolerated     Interventions Planned (Treatment may consist of any combination of the following):    Current Treatment Recommendations: Strengthening; ROM; Balance training; Functional mobility training; ADL/Self-care training; Stair training; Neuromuscular re-education; Manual; Pain management; Home exercise program; Safety education & training; Patient/Caregiver education & training; Positioning; Dry needling; Aquatics; Therapeutic activities     Subjective Comments:  See Evaluation Note from today for details  Initial:}     /10Post Session:        /10  Medications Last Reviewed:  2023  Updated Objective Findings:  See evaluation note from today  Treatment   THERAPEUTIC EXERCISE: (20 minutes):    Exercises per grid below to improve mobility, strength, balance, and coordination.   Required minimal visual, verbal, manual, and tactile cues to promote proper body posture and promote proper body mechanics. Progressed resistance, range, repetitions, and complexity of movement as indicated. Date:  1/11 Date:   Date:     Activity/Exercise Parameters Parameters Parameters   Education Plan of car, prognosis, anatomy, HEP     NuStep X10min lvl 2.5     SLR 3x10     Bridges 3x10                           MANUAL THERAPY: (10 minutes):   Joint mobilization and Soft tissue mobilization was utilized and necessary because of the patient's restricted joint motion, painful spasm, loss of articular motion, and restricted motion of soft tissue. Date:  1/11 Date:   Date:     Activity/Exercise Parameters Parameters Parameters   Soft Tissue Quad     Joint Mobilization Knee: A-P grades II-IV     Dry Needling            Treatment/Session Summary:    Treatment Assessment:  See Evaluation Note from today for details  Communication/Consultation:  None today  Equipment provided today:  None  Recommendations/Intent for next treatment session: Next visit will focus on progression as tolerated.     Total Treatment Billable Duration:  45 minutes  Time In: 0930  Time Out: 3452 Ada Ibrahim PT       Charge Capture  }Post Session Pain  PT Visit Info  MedBridge Portal  MD Guidelines  Scanned Media  Benefits  MyChart    Future Appointments   Date Time Provider Jayne Eddy   1/16/2023  8:00 AM Greg Kahn PT [facility]   1/18/2023  8:00 AM JF KongO   1/23/2023  8:45 AM JF Kong [facility]   1/25/2023  1:00 PM Greg Kahn PT [facility]   4/11/2023  9:05 AM Sheryl Crandall MD PO GVL AMB

## 2023-01-16 ENCOUNTER — HOSPITAL ENCOUNTER (OUTPATIENT)
Dept: PHYSICAL THERAPY | Age: 68
Setting detail: RECURRING SERIES
Discharge: HOME | End: 2023-01-19
Payer: MEDICARE

## 2023-01-16 PROCEDURE — 97110 THERAPEUTIC EXERCISES: CPT

## 2023-01-16 PROCEDURE — 97140 MANUAL THERAPY 1/> REGIONS: CPT

## 2023-01-16 ASSESSMENT — PAIN SCALES - GENERAL: PAINLEVEL_OUTOF10: 2

## 2023-01-16 NOTE — PROGRESS NOTES
Latesha Carroll  : 1955  Primary: Medicare Part A And B (Medicare)  Secondary: NESSA HAMPTON [facility] @ [address] [place] [address]  Phone: [phone]  Fax: [phone] Plan Frequency: 2x/week for 90 days    Plan of Care/Certification Expiration Date: 23      PT Visit Info:  Plan Frequency: 2x/week for 90 days  Plan of Care/Certification Expiration Date: 23      Visit Count:  2    OUTPATIENT PHYSICAL THERAPY:OP NOTE TYPE: Treatment Note 2023       Episode  }Appt Desk             Treatment Diagnosis:  Pain in Left Knee (M25.562)  Stiffness of Left Knee, Not elsewhere classified (M25.662)  Difficulty in walking, Not elsewhere classified (R26.2)  Medical/Referring Diagnosis:  Presence of left artificial knee joint [Z96.652]  Referring Physician:  Fritz Chavira MD MD Orders:  PT Eval and Treat   Date of Onset:  Onset Date: 22     Allergies:   Codeine  Restrictions/Precautions:  Restrictions/Precautions: Weight Bearing  Left Lower Extremity Weight Bearing: Weight Bearing As Tolerated     Interventions Planned (Treatment may consist of any combination of the following):    Current Treatment Recommendations: Strengthening; ROM; Balance training; Functional mobility training; ADL/Self-care training; Stair training; Neuromuscular re-education; Manual; Pain management; Home exercise program; Safety education & training; Patient/Caregiver education & training; Positioning; Dry needling; Aquatics; Therapeutic activities     Subjective Comments:  Pt states she is feeling better today  Initial:}    2/10Post Session:       1/10  Medications Last Reviewed:  2023  Updated Objective Findings:  None Today  Treatment   THERAPEUTIC EXERCISE: (35 minutes):    Exercises per grid below to improve mobility, strength, balance, and coordination.  Required minimal visual, verbal, manual, and tactile cues to promote proper body posture  and promote proper body mechanics. Progressed resistance, range, repetitions, and complexity of movement as indicated. Date:  1/11 Date:  1/16 Date:     Activity/Exercise Parameters Parameters Parameters   Education Plan of car, prognosis, anatomy, HEP     NuStep X10min lvl 2.5 X10min lvl 3    SLR 3x10 3x10    Bridges 3x10 3x10    Hip ABD 3x10 B 17.5lbs 3x10 B 17.5lbs    Hip Flexion  3x10 B 17.5lbs    Shuttle Press  3x10 75lbs                          MANUAL THERAPY: (10 minutes):   Joint mobilization and Soft tissue mobilization was utilized and necessary because of the patient's restricted joint motion, painful spasm, loss of articular motion, and restricted motion of soft tissue. Date:  1/11 Date:  1/16 Date:     Activity/Exercise Parameters Parameters Parameters   Soft Tissue Quad Quad    Joint Mobilization Knee: A-P grades II-IV     Dry Needling            Treatment/Session Summary:    Treatment Assessment:  Pt tolerated session well, no pain with exercises. Maintained flexion ROM  Communication/Consultation:  None today  Equipment provided today:  None  Recommendations/Intent for next treatment session: Next visit will focus on progression as tolerated.     Total Treatment Billable Duration:  45 minutes  Time In: 0805  Time Out: Jno 6, PT       Charge Capture  }Post Session Pain  PT Visit Info  MedBridge Portal  MD Guidelines  Scanned Media  Benefits  MyChart    Future Appointments   Date Time Provider Jayne Eddy   1/18/2023  8:00 AM Garry Henderson PT [facility]   1/23/2023  8:45 AM Garry Henderson PT [facility] BANUELOS   1/25/2023  1:00 PM Garry Henderson PT EILE[facility]   4/11/2023  9:05 AM Libia Hay MD [facility] AMB

## 2023-01-17 ENCOUNTER — TELEPHONE (OUTPATIENT)
Dept: ORTHOPEDIC SURGERY | Age: 68
End: 2023-01-17

## 2023-01-17 NOTE — TELEPHONE ENCOUNTER
We have the patient scheduled for 3/27 for surgery. I left the patient a voicemail. She may call back with any questions.

## 2023-01-18 ENCOUNTER — HOSPITAL ENCOUNTER (OUTPATIENT)
Dept: PHYSICAL THERAPY | Age: 68
Setting detail: RECURRING SERIES
Discharge: HOME | End: 2023-01-21
Payer: MEDICARE

## 2023-01-18 PROCEDURE — 97110 THERAPEUTIC EXERCISES: CPT

## 2023-01-18 PROCEDURE — 97140 MANUAL THERAPY 1/> REGIONS: CPT

## 2023-01-18 ASSESSMENT — PAIN SCALES - GENERAL: PAINLEVEL_OUTOF10: 1

## 2023-01-18 NOTE — PROGRESS NOTES
Jl Evelyne  : 1955  Primary: Medicare Part A And B (Medicare)  Secondary: Carter Molina @ [facility]  [address] [place] [address]  Phone: [phone]  Fax: [phone] Plan Frequency: 2x/week for 90 days    Plan of Care/Certification Expiration Date: 23      PT Visit Info:  Plan Frequency: 2x/week for 90 days  Plan of Care/Certification Expiration Date: 23      Visit Count:  3    OUTPATIENT PHYSICAL THERAPY:OP NOTE TYPE: Treatment Note 2023       Episode  }Appt Desk             Treatment Diagnosis:  Pain in Left Knee (M25.562)  Stiffness of Left Knee, Not elsewhere classified (M25.662)  Difficulty in walking, Not elsewhere classified (R26.2)  Medical/Referring Diagnosis:  Presence of left artificial knee joint [Q55.546]  Referring Physician:  Marixa Anaya MD MD Orders:  PT Eval and Treat   Date of Onset:  Onset Date: 22     Allergies:   Codeine  Restrictions/Precautions:  Restrictions/Precautions: Weight Bearing  Left Lower Extremity Weight Bearing: Weight Bearing As Tolerated     Interventions Planned (Treatment may consist of any combination of the following):    Current Treatment Recommendations: Strengthening; ROM; Balance training; Functional mobility training; ADL/Self-care training; Stair training; Neuromuscular re-education; Manual; Pain management; Home exercise program; Safety education & training; Patient/Caregiver education & training; Positioning; Dry needling; Aquatics; Therapeutic activities     Subjective Comments:  Pt states she is doing better  Initial:}    1/10Post Session:       1/10  Medications Last Reviewed:  2023  Updated Objective Findings:  None Today  Treatment   THERAPEUTIC EXERCISE: (35 minutes):    Exercises per grid below to improve mobility, strength, balance, and coordination.   Required minimal visual, verbal, manual, and tactile cues to promote proper body posture and promote proper body mechanics. Progressed resistance, range, repetitions, and complexity of movement as indicated. Date:  1/16 Date:  1/18   Activity/Exercise Parameters Parameters   Education     NuStep X10min lvl 3 X10min lvl 3   SLR 3x10 3x10   Bridges 3x10 3x10   Hip ABD 3x10 B 17.5lbs 3x10 B 17.5lbs   Hip Flexion 3x10 B 17.5lbs 3x10 B 17.5lbs   Shuttle Press 3x10 75lbs 3x10 87lbs   Sit to Stand  3x10                 MANUAL THERAPY: (10 minutes):   Joint mobilization and Soft tissue mobilization was utilized and necessary because of the patient's restricted joint motion, painful spasm, loss of articular motion, and restricted motion of soft tissue. Date:  1/11 Date:  1/16 Date:  1/18   Activity/Exercise Parameters Parameters Parameters   Soft Tissue Quad Quad Quad   Joint Mobilization Knee: A-P grades II-IV     Dry Needling            Treatment/Session Summary:    Treatment Assessment:  Pt continues to maintain ROM between sessions and is improving in strength of the quad  Communication/Consultation:  None today  Equipment provided today:  None  Recommendations/Intent for next treatment session: Next visit will focus on progression as tolerated.     Total Treatment Billable Duration:  45 minutes  Time In: 0800  Time Out: [address], PT       Charge Capture  }Post Session Pain  PT Visit Info  [employer] Portal  MD Guidelines  Scanned Media  Benefits  MyChart    Future Appointments   Date Time Provider Jayne Eddy   1/23/2023  8:45 AM Tete Gaytan, PT [facility]   1/25/2023  1:00 PM Tete Gaytan PT [facility]   4/11/2023  9:05 AM MD ZARA Valentin AMB

## 2023-01-20 DIAGNOSIS — M17.11 PRIMARY OSTEOARTHRITIS OF RIGHT KNEE: Primary | ICD-10-CM

## 2023-01-20 DIAGNOSIS — M21.961 ACQUIRED DEFORMITY OF RIGHT KNEE: ICD-10-CM

## 2023-01-23 ENCOUNTER — HOSPITAL ENCOUNTER (OUTPATIENT)
Dept: PHYSICAL THERAPY | Age: 68
Setting detail: RECURRING SERIES
Discharge: HOME | End: 2023-01-26
Payer: MEDICARE

## 2023-01-23 PROBLEM — M21.961 ACQUIRED DEFORMITY OF RIGHT KNEE: Status: ACTIVE | Noted: 2023-01-23

## 2023-01-23 PROBLEM — M17.11 PRIMARY OSTEOARTHRITIS OF RIGHT KNEE: Status: ACTIVE | Noted: 2023-01-23

## 2023-01-23 PROCEDURE — 97110 THERAPEUTIC EXERCISES: CPT

## 2023-01-23 PROCEDURE — 97140 MANUAL THERAPY 1/> REGIONS: CPT

## 2023-01-23 ASSESSMENT — PAIN SCALES - GENERAL: PAINLEVEL_OUTOF10: 1

## 2023-01-23 NOTE — PROGRESS NOTES
Brittny Acevedo  : 1955  Primary: Medicare Part A And B (Medicare)  Secondary: Carter Molina @ [facility]  [address] [place] [address]  Phone: [phone]  Fax: [phone] Plan Frequency: 2x/week for 90 days    Plan of Care/Certification Expiration Date: 23      PT Visit Info:  Plan Frequency: 2x/week for 90 days  Plan of Care/Certification Expiration Date: 23      Visit Count:  4    OUTPATIENT PHYSICAL THERAPY:OP NOTE TYPE: Treatment Note 2023       Episode  }Appt Desk             Treatment Diagnosis:  Pain in Left Knee (M25.562)  Stiffness of Left Knee, Not elsewhere classified (M25.662)  Difficulty in walking, Not elsewhere classified (R26.2)  Medical/Referring Diagnosis:  Presence of left artificial knee joint [C29.372]  Referring Physician:  Donnie Luna MD MD Orders:  PT Eval and Treat   Date of Onset:  Onset Date: 22     Allergies:   Codeine  Restrictions/Precautions:  Restrictions/Precautions: Weight Bearing  Left Lower Extremity Weight Bearing: Weight Bearing As Tolerated     Interventions Planned (Treatment may consist of any combination of the following):    Current Treatment Recommendations: Strengthening; ROM; Balance training; Functional mobility training; ADL/Self-care training; Stair training; Neuromuscular re-education; Manual; Pain management; Home exercise program; Safety education & training; Patient/Caregiver education & training; Positioning; Dry needling; Aquatics; Therapeutic activities     Subjective Comments:  Pt states she is doing well, feeling better  Initial:}    1/10Post Session:       1/10  Medications Last Reviewed:  2023  Updated Objective Findings:  None Today  Treatment   THERAPEUTIC EXERCISE: (35 minutes):    Exercises per grid below to improve mobility, strength, balance, and coordination.   Required minimal visual, verbal, manual, and tactile cues to promote proper body posture and promote proper body mechanics. Progressed resistance, range, repetitions, and complexity of movement as indicated. Date:  1/16 Date:  1/18 Date:  1/23   Activity/Exercise Parameters Parameters    Education      NuStep X10min lvl 3 X10min lvl 3 X10min lvl 3   SLR 3x10 3x10    Bridges 3x10 3x10    Hip ABD 3x10 B 17.5lbs 3x10 B 17.5lbs 3x10 B 17.5lbs   Hip Flexion 3x10 B 17.5lbs 3x10 B 17.5lbs 3x10 B 17.5lbs   Shuttle Press 3x10 75lbs 3x10 87lbs DL: 3x10 125lbs  SL: 3x10 B 62lbs   Sit to Stand  3x10 2x15                   MANUAL THERAPY: (10 minutes):   Joint mobilization and Soft tissue mobilization was utilized and necessary because of the patient's restricted joint motion, painful spasm, loss of articular motion, and restricted motion of soft tissue. Date:  1/16 Date:  1/18 Date:  1/23   Activity/Exercise Parameters Parameters    Soft Tissue Quad Quad Quad   Joint Mobilization      Dry Needling            Treatment/Session Summary:    Treatment Assessment:  Pt continues to make improvements in ambulatory ability and ROM  Communication/Consultation:  None today  Equipment provided today:  None  Recommendations/Intent for next treatment session: Next visit will focus on progression as tolerated.     Total Treatment Billable Duration:  45 minutes  Time In: 0800  Time Out: [address], PT       Charge Capture  }Post Session Pain  PT Visit Info  MedBridge Portal  MD Guidelines  Scanned Media  Benefits  MyChart    Future Appointments   Date Time Provider Jayne Eddy   1/23/2023  8:45 AM Jyothi Babb, PT [facility]   1/25/2023  1:00 PM Jyothi Babb PT [facility]   3/6/2023  9:00 AM SFE ASSESSMENT RM 03 [facility]PA [facility]   4/11/2023  9:05 AM MD ZARA Scott AMB   4/18/2023  8:25 AM MD ZARA Scott AMB

## 2023-01-25 ENCOUNTER — HOSPITAL ENCOUNTER (OUTPATIENT)
Dept: PHYSICAL THERAPY | Age: 68
Setting detail: RECURRING SERIES
Discharge: HOME | End: 2023-01-28
Payer: MEDICARE

## 2023-01-25 PROCEDURE — 97110 THERAPEUTIC EXERCISES: CPT

## 2023-01-25 ASSESSMENT — PAIN SCALES - GENERAL: PAINLEVEL_OUTOF10: 0

## 2023-01-25 NOTE — PROGRESS NOTES
Latesha Carroll  : 1955  Primary: Medicare Part A And B (Medicare)  Secondary: NESSA HAMPTON [facility] @ [address] [place] [address]  Phone: [phone]  Fax: [phone] Plan Frequency: 2x/week for 90 days    Plan of Care/Certification Expiration Date: 23      PT Visit Info:  Plan Frequency: 2x/week for 90 days  Plan of Care/Certification Expiration Date: 23      Visit Count:  5    OUTPATIENT PHYSICAL THERAPY:OP NOTE TYPE: Treatment Note 2023       Episode  }Appt Desk             Treatment Diagnosis:  Pain in Left Knee (M25.562)  Stiffness of Left Knee, Not elsewhere classified (M25.662)  Difficulty in walking, Not elsewhere classified (R26.2)  Medical/Referring Diagnosis:  Presence of left artificial knee joint [Z96.652]  Referring Physician:  Fritz Chavira MD MD Orders:  PT Eval and Treat   Date of Onset:  Onset Date: 22     Allergies:   Codeine  Restrictions/Precautions:  Restrictions/Precautions: Weight Bearing  Left Lower Extremity Weight Bearing: Weight Bearing As Tolerated     Interventions Planned (Treatment may consist of any combination of the following):    Current Treatment Recommendations: Strengthening; ROM; Balance training; Functional mobility training; ADL/Self-care training; Stair training; Neuromuscular re-education; Manual; Pain management; Home exercise program; Safety education & training; Patient/Caregiver education & training; Positioning; Dry needling; Aquatics; Therapeutic activities     Subjective Comments:  Pt states she is doing very well  Initial:}    0/10Post Session:       0/10  Medications Last Reviewed:  2023  Updated Objective Findings:  None Today  Treatment   THERAPEUTIC EXERCISE: (45 minutes):    Exercises per grid below to improve mobility, strength, balance, and coordination.  Required minimal visual, verbal, manual, and tactile cues to promote proper body posture and  promote proper body mechanics. Progressed resistance, range, repetitions, and complexity of movement as indicated. Date:  1/18 Date:  1/23 Date:  1/25   Activity/Exercise Parameters     Education      NuStep X10min lvl 3 X10min lvl 3    SLR 3x10     Bridges 3x10     Hip ABD 3x10 B 17.5lbs 3x10 B 17.5lbs 3x10 B 17.5lbs   Hip Flexion 3x10 B 17.5lbs 3x10 B 17.5lbs 3x10 B 17.5lbs   Shuttle Press 3x10 87lbs DL: 3x10 125lbs  SL: 3x10 B 62lbs DL: 3x10 150lbs  SL: 3x10 B 75lbs   Sit to Stand 3x10 2x15 3x10 4lbs   Hamstring Curls   3x10 30lbs   Calf Stretch   x4min   Calf Raises   X40 on half foam   Step Ups   3x10 8\"   Stairs   3x             MANUAL THERAPY: (0 minutes):   Joint mobilization and Soft tissue mobilization was utilized and necessary because of the patient's restricted joint motion, painful spasm, loss of articular motion, and restricted motion of soft tissue. Date:  1/16 Date:  1/18 Date:  1/23   Activity/Exercise Parameters Parameters    Soft Tissue [employer]   Joint Mobilization      Dry Needling            Treatment/Session Summary:    Treatment Assessment:  Pt has good ROM, strength and functional mobility. Navigated stairs well  Communication/Consultation:  None today  Equipment provided today:  None  Recommendations/Intent for next treatment session: Next visit will focus on progression as tolerated.     Total Treatment Billable Duration:  45 minutes  Time In: 1300  Time Out: [address]  }Post Session Pain  PT Visit Info  Em[facility] Portal  MD Guidelines  Scanned Media  Benefits  MyChart    Future Appointments   Date Time Provider Jayne Eddy   1/31/2023  8:00 AM Manuelita Redo, PT [facility]   2/2/2023  8:00 AM Manuelita Redo, PT [facility]   2/7/2023  8:00 AM Manuelita Redo, PT [facility] O   2/9/2023  8:00 AM Manuelita Redo, PT [facility]   2/14/2023  8:00 AM Manuelita Redo, PT [facility]   2/16/2023  8:00 AM Manuelita Redo, PT [facility]   2/21/2023  8:00 AM Taylor Sia, PT [facility]   2/23/2023  8:00 AM Taylor Sia, PT [facility]   2/28/2023  8:00 AM Taylor Sia, PT [facility]   3/6/2023  9:00 AM SFE ASSESSMENT RM 03 [facility]   3/21/2023  2:40 PM MD ZARA Venegas AMB   4/11/2023  9:05 AM MD ZARA Venegas AMB   4/18/2023  8:25 AM MD ZARA Venegas AMB

## 2023-01-31 ENCOUNTER — HOSPITAL ENCOUNTER (OUTPATIENT)
Dept: PHYSICAL THERAPY | Age: 68
Setting detail: RECURRING SERIES
Discharge: HOME | End: 2023-02-03
Payer: MEDICARE

## 2023-01-31 ENCOUNTER — APPOINTMENT (OUTPATIENT)
Dept: PHYSICAL THERAPY | Age: 68
End: 2023-01-31
Payer: MEDICARE

## 2023-01-31 PROCEDURE — 97110 THERAPEUTIC EXERCISES: CPT

## 2023-01-31 ASSESSMENT — PAIN SCALES - GENERAL: PAINLEVEL_OUTOF10: 0

## 2023-01-31 NOTE — PROGRESS NOTES
Alex President  : 1955  Primary: Medicare Part A And B (Medicare)  Secondary: Carter Molina @ [facility]  [address] [place] [address]  Phone: [phone]  Fax: [phone] Plan Frequency: 2x/week for 90 days    Plan of Care/Certification Expiration Date: 23      PT Visit Info:  Plan Frequency: 2x/week for 90 days  Plan of Care/Certification Expiration Date: 23      Visit Count:  6    OUTPATIENT PHYSICAL THERAPY:OP NOTE TYPE: Treatment Note 2023       Episode  }Appt Desk             Treatment Diagnosis:  Pain in Left Knee (M25.562)  Stiffness of Left Knee, Not elsewhere classified (M25.662)  Difficulty in walking, Not elsewhere classified (R26.2)  Medical/Referring Diagnosis:  Presence of left artificial knee joint [X58.887]  Referring Physician:  Kassandra Hernandez MD MD Orders:  PT Eval and Treat   Date of Onset:  Onset Date: 22     Allergies:   Codeine  Restrictions/Precautions:  Restrictions/Precautions: Weight Bearing  Left Lower Extremity Weight Bearing: Weight Bearing As Tolerated     Interventions Planned (Treatment may consist of any combination of the following):    Current Treatment Recommendations: Strengthening; ROM; Balance training; Functional mobility training; ADL/Self-care training; Stair training; Neuromuscular re-education; Manual; Pain management; Home exercise program; Safety education & training; Patient/Caregiver education & training; Positioning; Dry needling; Aquatics; Therapeutic activities     Subjective Comments:  Patient reports knee is doing pretty good today. Initial:}    0/10Post Session:       0/10  Medications Last Reviewed:  2023  Updated Objective Findings:  None Today  Treatment   THERAPEUTIC EXERCISE: (45 minutes):    Exercises per grid below to improve mobility, strength, balance, and coordination.   Required minimal visual, verbal, manual, and tactile cues to promote proper body posture and promote proper body mechanics. Progressed resistance, range, repetitions, and complexity of movement as indicated. Date:  1/25 Date  1/31   Activity/Exercise     Education     NuStep  X 10 min   SLR     Bridges     Hip ABD 3x10 B 17.5lbs 3x10 B 17.5lbs   Hip Flexion 3x10 B 17.5lbs 3x10 B 17.5lbs   Shuttle Press DL: 3x10 150lbs  SL: 3x10 B 75lbs DL: 3x10 150lbs  SL: 3x10 B 75lbs   Sit to Stand 3x10 4lbs    Hamstring Curls 3x10 30lbs 3x10 30lbs   Calf Stretch x4min    Calf Raises X40 on half foam    Step Ups 3x10 8\" 3x10 8\"   Stairs 3x X 3            MANUAL THERAPY: (0 minutes):   Joint mobilization and Soft tissue mobilization was utilized and necessary because of the patient's restricted joint motion, painful spasm, loss of articular motion, and restricted motion of soft tissue. Date:  1/16 Date:  1/18 Date:  1/23   Activity/Exercise Parameters Parameters    Soft Tissue [employer]   Joint Mobilization      Dry Needling            Treatment/Session Summary:    Treatment Assessment:  Patient demonstrated good effort wtih all exercises. Instructed patient to continue home exercises as directed  Communication/Consultation:  None today  Equipment provided today:  None  Recommendations/Intent for next treatment session: Next visit will focus on progression as tolerated.     Total Treatment Billable Duration:  45 minutes  Time In: 1100  Time Out: [address] [place] [address]       Charge Capture  }Post Session Pain  PT Visit Info  Med[employer] Portal  MD Guidelines  Scanned Media  Benefits  MyChart    Future Appointments   Date Time Provider Jayne Eddy   2/7/2023  8:00 AM Janny Batch, PT [facility]   2/9/2023  8:00 AM Janny Batch, PT [facility]   2/14/2023  8:00 AM Janny Batch, PT [facility]   2/16/2023  8:00 AM Janny Batch, PT [facility]   2/21/2023  8:00 AM Janny Batch, PT [facility]   2/23/2023  8:00 AM Janny Batch, PT [facility]   2/28/2023  8:00 AM Le Medina, PT [facility]   3/6/2023  9:00 AM SFE ASSESSMENT RM 03 [facility]   3/21/2023  2:40 PM Grant Ruiz, MD ZARA VALDEZ AMB   4/11/2023  9:05 AM Grant Ruiz, MD ZARA VALDEZ AMB   4/18/2023  8:25 AM Grant Ruiz, MD ZARA VALDEZ AMB

## 2023-02-02 ENCOUNTER — APPOINTMENT (OUTPATIENT)
Dept: PHYSICAL THERAPY | Age: 68
End: 2023-02-02
Payer: MEDICARE

## 2023-02-07 ENCOUNTER — HOSPITAL ENCOUNTER (OUTPATIENT)
Dept: PHYSICAL THERAPY | Age: 68
Setting detail: RECURRING SERIES
Discharge: HOME | End: 2023-02-10
Payer: MEDICARE

## 2023-02-07 PROCEDURE — 97110 THERAPEUTIC EXERCISES: CPT

## 2023-02-07 ASSESSMENT — PAIN SCALES - GENERAL: PAINLEVEL_OUTOF10: 0

## 2023-02-07 NOTE — PROGRESS NOTES
Nikolay Forward  : 1955  Primary: Medicare Part A And B (Medicare)  Secondary: [facility]  [address] [place] [address]  Phone: [phone]  Fax: [phone] Plan Frequency: 2x/week for 90 days    Plan of Care/Certification Expiration Date: 23      PT Visit Info:  Plan Frequency: 2x/week for 90 days  Plan of Care/Certification Expiration Date: 23      Visit Count:  7    OUTPATIENT PHYSICAL THERAPY:OP NOTE TYPE: Treatment Note 2023       Episode  }Appt Desk             Treatment Diagnosis:  Pain in Left Knee (M25.562)  Stiffness of Left Knee, Not elsewhere classified (M25.662)  Difficulty in walking, Not elsewhere classified (R26.2)  Medical/Referring Diagnosis:  Presence of left artificial knee joint [Z92.306]  Referring Physician:  Jas Kessler MD MD Orders:  PT Eval and Treat   Date of Onset:  Onset Date: 22     Allergies:   Codeine  Restrictions/Precautions:  Restrictions/Precautions: Weight Bearing  Left Lower Extremity Weight Bearing: Weight Bearing As Tolerated     Interventions Planned (Treatment may consist of any combination of the following):    Current Treatment Recommendations: Strengthening; ROM; Balance training; Functional mobility training; ADL/Self-care training; Stair training; Neuromuscular re-education; Manual; Pain management; Home exercise program; Safety education & training; Patient/Caregiver education & training; Positioning; Dry needling; Aquatics; Therapeutic activities     Subjective Comments:  See discharge Note  Initial:}    0/10Post Session:       0/10  Medications Last Reviewed:  2023  Updated Objective Findings:  None Today  Treatment   THERAPEUTIC EXERCISE: (30 minutes):    Exercises per grid below to improve mobility, strength, balance, and coordination.   Required minimal visual, verbal, manual, and tactile cues to promote proper body posture and promote proper body mechanics. Progressed resistance, range, repetitions, and complexity of movement as indicated. Date  1/31 Date:  2/7   Activity/Exercise     Education  D/C assessment, HEP review   NuStep X 10 min X10min lvl 4   SLR     Bridges     Hip ABD 3x10 B 17.5lbs    Hip Flexion 3x10 B 17.5lbs    Shuttle Press DL: 3x10 150lbs  SL: 3x10 B 75lbs DL: 3x10 150lbs  SL: 3x10 B 75lbs   Sit to Stand     Hamstring Curls 3x10 30lbs    Calf Stretch     Calf Raises     Step Ups 3x10 8\"    Stairs X 3             MANUAL THERAPY: (0 minutes):   Joint mobilization and Soft tissue mobilization was utilized and necessary because of the patient's restricted joint motion, painful spasm, loss of articular motion, and restricted motion of soft tissue. Date:  1/16 Date:  1/18 Date:  1/23   Activity/Exercise Parameters Parameters    Soft Tissue [employer]   Joint Mobilization      Dry Needling            Treatment/Session Summary:    Treatment Assessment:  See Discharge Note  Communication/Consultation:  None today  Equipment provided today:  None  Recommendations/Intent for next treatment session: Next visit will focus on progression as tolerated.     Total Treatment Billable Duration:  30 minutes  Time In: 4997  Time Out: Karissa Apodaca [address] PT       Charge Capture  }Post Session Pain  PT Visit Info  MedBridge Portal  MD Guidelines  Scanned Media  Benefits  MyChart    Future Appointments   Date Time Provider Jayne Eddy   3/6/2023  9:00 AM SFE ASSESSMENT RM 03 [facility]   3/21/2023  2:40 PM MD ZARA Corbin   4/11/2023  9:05 AM MD ZARA Corbin   4/18/2023  8:25 AM MD ZARA Corbin AMB

## 2023-02-07 NOTE — THERAPY DISCHARGE
Nicolette Tyler  : 1955  Primary: Medicare Part A And B (Medicare)  Secondary: Carter Molina @ [facility]  [address] [place] [address]  Phone: [phone]  Fax: [phone] Plan Frequency: 2x/week for 90 days  Plan of Care/Certification Expiration Date: 23      PT Visit Info:    Plan Frequency: 2x/week for 90 days  Plan of Care/Certification Expiration Date: 23      Visit Count:  7                OUTPATIENT PHYSICAL THERAPY:             OP NOTE TYPE: Discharge Summary 2023               Episode  Appt Desk         Treatment Diagnosis:  Pain in Left Knee (M25.562)  Stiffness of Left Knee, Not elsewhere classified (M25.662)  Difficulty in walking, Not elsewhere classified (R26.2)  Medical/Referring Diagnosis:  Presence of left artificial knee joint [N70.109]  Referring Physician:  Deann Dalton MD MD Orders:  PT Eval and Treat   Return MD Appt:    Date of Onset:  Onset Date: 22      Allergies:  Codeine  Restrictions/Precautions:    Restrictions/Precautions: Weight Bearing  Left Lower Extremity Weight Bearing: Weight Bearing As Tolerated     Medications Last Reviewed:  2023     SUBJECTIVE   History of Injury/Illness (Reason for Referral):  Pt had a L TKA on 2022. Is doing very well, not having any pain, has high function. Wants her legs to be stronger  Patient Stated Goal(s):  \"Make my legs stronger\"  Initial:      /10 Post Session:      /10  Past Medical History/Comorbidities:   Ms. Rasheed Vieyra  has a past medical history of High cholesterol, Hypertension, and Obesity. Ms. Rasheed Vieyra  has a past surgical history that includes Knee arthroscopy (Bilateral); Tonsillectomy; Hysterectomy; Cholecystectomy; Gastric bypass surgery; and Total knee arthroplasty (Left, 2022). Social History/Living Environment:   Lives With: Parent  Type of Home: House  Home Layout: Two level;  Able to Live on Main level with bedroom/bathroom  Home Access: Stairs to enter without rails  Entrance Stairs - Number of Steps: 1  Bathroom Shower/Tub: Walk-in shower; Tub/Shower unit  Home Equipment: -- (none)     Prior Level of Function/Work/Activity:   Prior level of function: Hiking, walking  Prior level of function: Independent  Occupation: Retired           Learning:   Does the patient/guardian have any barriers to learning?: No barriers  Will there be a co-learner?: No  What is the preferred language of the patient/guardian?: English  Is an  required?: No  How does the patient/guardian prefer to learn new concepts?: Listening; Reading; Demonstration; Pictures/Videos     Fall Risk Scale: Hidalgo Total Score: 54  Hidalgo Fall Risk: High (45 and higher)     Personal Factors:        Sex:  female        Age:  78 y.o.      OBJECTIVE   Observation  Incision: unable to see  Gait: normal  Edema       -Right:  minimal       -Left: minimal    ROM   Right Left   Flexion 125 120   Extension 0 0     Strength (all MMT scores are graded on a scale of 0-5)   Right Left   Hip      Flexion 5 5   Abduction 4 4   Extension 4 4   Glute Max 4 4   Knee     Extension 5 5   Flexion 5 5       Joint/Soft Tissue Mobility   Description   Joint Mobility Knee: improved mobility   Soft Tissue Mobility No TTP       Functional Mobility  30sec Sit to Stand: 14x      ASSESSMENT   Initial Assessment:  Smith Salas presents to physical therapy with MD diagnosis of a s/p L TKA. Pt demonstrated signs and symptoms consistent with this diagnosis. On objective examination, the patient demonstrated deficits in ROM, strength, joint mobility, soft tissue mobility, functional ability, functional mobility, ambulatory ability, and balance. The patient also had increased pain, edema. The patient is limited in the following activities: walking, standing, sitting, transfers, bending, lifting, stairs, ADLs, functional tasks.  The patient has a good  prognosis for recovery based on the examination findings and may be limited by: N/A. Patient requires skilled physical therapy services in order to return to prior level of function. Discharge Assessment: Grzegorz Delacruz presented to physical therapy with MD diagnosis of a s/p L TKA. Pt demonstrated signs and symptoms consistent with this diagnosis. On objective examination, the patient demonstrated improvements in ROM, strength, joint mobility, soft tissue mobility, ambulatory ability, functional ability. The patient also has decreased pain, edema These improvements have increased the patient's ability to: walk, stand, sit, stairs, functional tasks, ADLs. The patient is still limited in the following activities: prolonged walking/exercise. The patient is being discharged from therapy this time due to achieving goals and being independent with HEP. PLAN     Goals: (Goals have been discussed and agreed upon with patient.)  Discharge Goals: Time Frame: 5 weeks  Pt will be compliant with progressive HEP-- goal met  Pt will have > 120deg flexion-- goal met  Pt will be able to walk 20min with pain < 2/10-- goal met         Outcome Measure: Tool Used: Knee injury and Osteoarthritis Outcome Score for Joint Replacement (KOOS, JR)  Score:  Initial: 2/28 Most Recent: 0/28 (2/7/23)   Interpretation of Score: The KOOS, JR contains 7 items from the original KOOS survey. Items are coded from 0 to 4, none to extreme respectively. Carlota Cecily is scored by summing the raw response (range 0-28) and then converting it to an interval score using the table provided below. The interval score ranges from 0 to 100 where 0 represents total knee disability and 100 represents perfect knee health. Regarding Tete Carroll's therapy, I certify that the treatment plan above will be carried out by a therapist or under their direction.   Thank you for this referral,  Eusebia Barahona, PT     Referring Physician Signature: Jimbo Jalloh MD Post Session Pain  Charge Capture  PT Visit Info MD Guidelines  [place]

## 2023-02-09 ENCOUNTER — APPOINTMENT (OUTPATIENT)
Dept: PHYSICAL THERAPY | Age: 68
End: 2023-02-09
Payer: MEDICARE

## 2023-02-14 ENCOUNTER — APPOINTMENT (OUTPATIENT)
Dept: PHYSICAL THERAPY | Age: 68
End: 2023-02-14
Payer: MEDICARE

## 2023-02-16 ENCOUNTER — APPOINTMENT (OUTPATIENT)
Dept: PHYSICAL THERAPY | Age: 68
End: 2023-02-16
Payer: MEDICARE

## 2023-02-21 ENCOUNTER — APPOINTMENT (OUTPATIENT)
Dept: PHYSICAL THERAPY | Age: 68
End: 2023-02-21
Payer: MEDICARE

## 2023-02-23 ENCOUNTER — APPOINTMENT (OUTPATIENT)
Dept: PHYSICAL THERAPY | Age: 68
End: 2023-02-23
Payer: MEDICARE

## 2023-02-28 ENCOUNTER — PREP FOR PROCEDURE (OUTPATIENT)
Dept: ORTHOPEDIC SURGERY | Age: 68
End: 2023-02-28

## 2023-02-28 ENCOUNTER — APPOINTMENT (OUTPATIENT)
Dept: PHYSICAL THERAPY | Age: 68
End: 2023-02-28
Payer: MEDICARE

## 2023-02-28 DIAGNOSIS — M17.11 PRIMARY OSTEOARTHRITIS OF RIGHT KNEE: Primary | ICD-10-CM

## 2023-02-28 RX ORDER — SODIUM CHLORIDE 0.9 % (FLUSH) 0.9 %
5-40 SYRINGE (ML) INJECTION EVERY 12 HOURS SCHEDULED
Status: CANCELLED | OUTPATIENT
Start: 2023-02-28

## 2023-02-28 RX ORDER — SODIUM CHLORIDE 0.9 % (FLUSH) 0.9 %
5-40 SYRINGE (ML) INJECTION PRN
Status: CANCELLED | OUTPATIENT
Start: 2023-02-28

## 2023-02-28 RX ORDER — SODIUM CHLORIDE 9 MG/ML
INJECTION, SOLUTION INTRAVENOUS PRN
Status: CANCELLED | OUTPATIENT
Start: 2023-02-28

## 2023-03-06 ENCOUNTER — HOSPITAL ENCOUNTER (OUTPATIENT)
Dept: SURGERY | Age: 68
Discharge: HOME | End: 2023-03-09
Payer: MEDICARE

## 2023-03-06 VITALS
OXYGEN SATURATION: 98 % | BODY MASS INDEX: 36.33 KG/M2 | DIASTOLIC BLOOD PRESSURE: 94 MMHG | HEART RATE: 100 BPM | RESPIRATION RATE: 18 BRPM | TEMPERATURE: 98.2 F | SYSTOLIC BLOOD PRESSURE: 151 MMHG | WEIGHT: 211.64 LBS

## 2023-03-06 DIAGNOSIS — M17.11 PRIMARY OSTEOARTHRITIS OF RIGHT KNEE: ICD-10-CM

## 2023-03-06 LAB
ALBUMIN SERPL-MCNC: 3.8 G/DL (ref 3.2–4.6)
ANION GAP SERPL CALC-SCNC: 2 MMOL/L (ref 2–11)
APTT PPP: 32.6 SEC (ref 24.5–34.2)
BACTERIA SPEC CULT: NORMAL
BASOPHILS # BLD: 0 K/UL (ref 0–0.2)
BASOPHILS NFR BLD: 0 % (ref 0–2)
BUN SERPL-MCNC: 17 MG/DL (ref 8–23)
CALCIUM SERPL-MCNC: 9.7 MG/DL (ref 8.3–10.4)
CHLORIDE SERPL-SCNC: 102 MMOL/L (ref 101–110)
CO2 SERPL-SCNC: 31 MMOL/L (ref 21–32)
CREAT SERPL-MCNC: 0.76 MG/DL (ref 0.6–1)
DIFFERENTIAL METHOD BLD: ABNORMAL
EOSINOPHIL # BLD: 0.2 K/UL (ref 0–0.8)
EOSINOPHIL NFR BLD: 3 % (ref 0.5–7.8)
ERYTHROCYTE [DISTWIDTH] IN BLOOD BY AUTOMATED COUNT: 14.7 % (ref 11.9–14.6)
EST. AVERAGE GLUCOSE BLD GHB EST-MCNC: 97 MG/DL
GLUCOSE SERPL-MCNC: 101 MG/DL (ref 65–100)
HBA1C MFR BLD: 5 % (ref 4.8–5.6)
HCT VFR BLD AUTO: 45.9 % (ref 35.8–46.3)
HGB BLD-MCNC: 14 G/DL (ref 11.7–15.4)
IMM GRANULOCYTES # BLD AUTO: 0 K/UL (ref 0–0.5)
IMM GRANULOCYTES NFR BLD AUTO: 0 % (ref 0–5)
INR PPP: 1
LYMPHOCYTES # BLD: 0.9 K/UL (ref 0.5–4.6)
LYMPHOCYTES NFR BLD: 12 % (ref 13–44)
MCH RBC QN AUTO: 28.1 PG (ref 26.1–32.9)
MCHC RBC AUTO-ENTMCNC: 30.5 G/DL (ref 31.4–35)
MCV RBC AUTO: 92 FL (ref 82–102)
MONOCYTES # BLD: 0.4 K/UL (ref 0.1–1.3)
MONOCYTES NFR BLD: 5 % (ref 4–12)
NEUTS SEG # BLD: 6.1 K/UL (ref 1.7–8.2)
NEUTS SEG NFR BLD: 79 % (ref 43–78)
NRBC # BLD: 0 K/UL (ref 0–0.2)
PLATELET # BLD AUTO: 220 K/UL (ref 150–450)
PMV BLD AUTO: 10.4 FL (ref 9.4–12.3)
POTASSIUM SERPL-SCNC: 3.1 MMOL/L (ref 3.5–5.1)
PROTHROMBIN TIME: 13.2 SEC (ref 12.6–14.3)
RBC # BLD AUTO: 4.99 M/UL (ref 4.05–5.2)
SERVICE CMNT-IMP: NORMAL
SODIUM SERPL-SCNC: 135 MMOL/L (ref 133–143)
WBC # BLD AUTO: 7.8 K/UL (ref 4.3–11.1)

## 2023-03-06 PROCEDURE — 85730 THROMBOPLASTIN TIME PARTIAL: CPT

## 2023-03-06 PROCEDURE — 87641 MR-STAPH DNA AMP PROBE: CPT

## 2023-03-06 PROCEDURE — 85610 PROTHROMBIN TIME: CPT

## 2023-03-06 PROCEDURE — 82040 ASSAY OF SERUM ALBUMIN: CPT

## 2023-03-06 PROCEDURE — 80048 BASIC METABOLIC PNL TOTAL CA: CPT

## 2023-03-06 PROCEDURE — 83036 HEMOGLOBIN GLYCOSYLATED A1C: CPT

## 2023-03-06 PROCEDURE — 85025 COMPLETE CBC W/AUTO DIFF WBC: CPT

## 2023-03-06 PROCEDURE — 80307 DRUG TEST PRSMV CHEM ANLYZR: CPT

## 2023-03-06 ASSESSMENT — PAIN SCALES - GENERAL: PAINLEVEL_OUTOF10: 0

## 2023-03-06 NOTE — PERIOP NOTE
PLEASE CONTINUE TAKING ALL PRESCRIPTION MEDICATIONS UP TO THE DAY OF SURGERY UNLESS OTHERWISE DIRECTED BELOW. DISCONTINUE all vitamins, herbals and supplements 21 days prior to surgery. DISCONTINUE Non-Steriodal Anti-Inflammatory (NSAIDS) such as Advil, Ibuprofen, Motrin, Naproxen and Aleve 5 days prior to surgery. Home Medications To Hold       (In addition to the above)               Home Medications to TAKE the Morning of Surgery    Rosuvastatin         Comments   The day before surgery please take Tylenol (Acetaminophen) 1000mg in the morning and again before bed. You may substitute for Tylenol 650 mg.        Bring Rosuvastatin, Dynahex wash, and Incentive Spirometer with you to hospital on the day of surgery. Please do not bring home medications with you on the day of surgery unless otherwise directed by your nurse. If you are instructed to bring home medications, please give them to your nurse as they will be administered by the nursing staff. If you have any questions, please call [facility] [phone]. A copy of this note was provided to the patient for reference.

## 2023-03-06 NOTE — PERIOP NOTE
Patient verified name and . Order for consent found in EHR and matches case posting; patient verified. Type 3 surgery, joint assessment complete. Labs per surgeon: CBC,BMP, PT/PTT, HgbA1c, Albumin, Nicotine; results pending. Labs per anesthesia protocol: no additional  EKG: Completed 22 prior to L TKA--results were reviewed and okayed by anesthesia on 22. No repeat EKG required. MRSA/MSSA swab collected; pharmacy to review and dose antibiotic as appropriate. Hospital approved surgical skin cleanser and instructions to return bottle on DOS given per hospital policy. Patient provided with handouts including Guide to Surgery, Pain Management, Hand Hygiene, Blood Transfusion Education, and [facility] Brochure. Patient answered medical/surgical history questions at their best of ability. All prior to admission medications documented in [facility]. Original medication prescription bottle visualized during patient appointment. Patient instructed to hold all vitamins 3 weeks prior to surgery and NSAIDS 5 days prior to surgery. Patient teach back successful and patient demonstrates knowledge of instruction.

## 2023-03-06 NOTE — PROGRESS NOTES
Total Joint Surgery Preoperative Chart Review      Patient ID:  Eva Mckeon  [id number]  31 y.o.  1955  Surgeon: Dr. Jacek Salazar  Date of Surgery: 3/27/2023  Procedure: Total Right Knee Arthroplasty  Primary Care Physician: Nancy Moreira [phone]  Specialty Physician(s):      Subjective: Eva Mckeon is a 78 y.o. White (non-) female who presents for preoperative evaluation for Total Right Knee arthroplasty. This is a preoperative chart review note based on data collected by the nurse at the surgical Pre-Assessment visit. Past Medical History:   Diagnosis Date    High cholesterol     managed with medication    Hypertension     managed with medication    Obesity       Past Surgical History:   Procedure Laterality Date    CHOLECYSTECTOMY      GASTRIC BYPASS SURGERY      sleeve    HYSTERECTOMY (CERVIX STATUS UNKNOWN)      KNEE ARTHROSCOPY Bilateral     TONSILLECTOMY      TOTAL KNEE ARTHROPLASTY Left 12/19/2022    Left KNEE TOTAL ARTHROPLASTY ROBOTIC/ SDD performed by Casey Cedeno MD at [facility]     No family history on file. Social History     Tobacco Use    Smoking status: Never    Smokeless tobacco: Never   Substance Use Topics    Alcohol use: Never       Prior to Admission medications    Medication Sig Start Date End Date Taking? Authorizing Provider   Amoxicillin 500 MG TABS Take 4 tablets by mouth 1 hour prior to any dental procedure. Patient not taking: Reported on 3/6/2023 1/10/23   ROBYN García - CNP   HYDROmorphone (DILAUDID) 2 MG tablet Take 2 mg by mouth every 4 hours as needed for Pain (moderate). Patient not taking: Reported on 3/6/2023    Historical Provider, MD   polyethylene glycol (GLYCOLAX) 17 g packet Take 17 g by mouth daily as needed for Constipation.  mix with [facility] [employer] water  Patient not taking: Reported on 3/6/2023    Historical Provider, MD   acetaminophen (TYLENOL) 500 MG tablet Take 2 tablets by mouth every 6 hours as needed for Pain  Patient taking differently: Take 500 mg by mouth every 4 hours as needed for Pain 12/12/22   Parth Fraire MD   aspirin EC 81 MG EC tablet Take 1 tablet by mouth in the morning and at bedtime  Patient not taking: Reported on 3/6/2023 12/12/22   Parth Fraire MD   gabapentin (NEURONTIN) 300 MG capsule Take 1 capsule by mouth 2 times daily for 15 days.   Patient not taking: Reported on 12/23/2022 12/12/22 12/27/22  Parth Fraire MD   meloxicam (MOBIC) 15 MG tablet Take 1 tablet by mouth daily  Patient not taking: Reported on 3/6/2023 12/12/22   Parth Fraire MD   omeprazole (PRILOSEC) 40 MG delayed release capsule Take 1 capsule by mouth daily  Patient not taking: No sig reported 12/12/22   Parth Fraire MD   senna (SENOKOT) 8.6 MG tablet Take 1 tablet by mouth 2 times daily  Patient not taking: No sig reported 12/12/22   Parth Fraire MD   ondansetron (ZOFRAN) 4 MG tablet Take 1 tablet by mouth 3 times daily as needed for Nausea or Vomiting  Patient not taking: No sig reported 12/12/22   Parth Fraire MD   ZINC PO Take by mouth  Patient not taking: Reported on 3/6/2023    Historical Provider, MD   lisinopril-hydroCHLOROthiazide (PRINZIDE;ZESTORETIC) 10-12.5 MG per tablet  5/3/22   Historical Provider, MD   rosuvastatin (CRESTOR) 10 MG tablet Take 40 mg by mouth daily 5/3/22   Historical Provider, MD     Allergies   Allergen Reactions    Codeine      Other reaction(s): Nausea and/or vomiting-Intolerance  Passing out, tramadol ok          Objective:     Physical Exam:   Patient Vitals for the past 24 hrs:   Temp Pulse Resp BP SpO2   03/06/23 0852 98.2 °F (36.8 °C) 100 18 (!) 151/94 98 %       ECG:    Encounter Date: 11/28/22   EKG 12 lead   Result Value    Ventricular Rate 85    Atrial Rate 85    P-R Interval 160    QRS Duration 74    Q-T Interval 352    QTc Calculation (Bazett) 418    P Axis 54    R Axis 63    T Axis 61    Diagnosis Normal sinus rhythm with sinus arrhythmia       Data Review: Labs:   Recent Results (from the past 24 hour(s))   Protime-INR    Collection Time: 03/06/23  9:10 AM   Result Value Ref Range    Protime 13.2 12.6 - 14.3 sec    INR 1.0     Hemoglobin A1c    Collection Time: 03/06/23  9:10 AM   Result Value Ref Range    Hemoglobin A1C 5.0 4.8 - 5.6 %    eAG 97 mg/dL   CBC with Auto Differential    Collection Time: 03/06/23  9:10 AM   Result Value Ref Range    WBC 7.8 4.3 - 11.1 K/uL    RBC 4.99 4.05 - 5.2 M/uL    Hemoglobin 14.0 11.7 - 15.4 g/dL    Hematocrit 45.9 35.8 - 46.3 %    MCV 92.0 82.0 - 102.0 FL    MCH 28.1 26.1 - 32.9 PG    MCHC 30.5 (L) 31.4 - 35.0 g/dL    RDW 14.7 (H) 11.9 - 14.6 %    Platelets 846 367 - 518 K/uL    MPV 10.4 9.4 - 12.3 FL    nRBC 0.00 0.0 - 0.2 K/uL    Differential Type AUTOMATED      Seg Neutrophils 79 (H) 43 - 78 %    Lymphocytes 12 (L) 13 - 44 %    Monocytes 5 4.0 - 12.0 %    Eosinophils % 3 0.5 - 7.8 %    Basophils 0 0.0 - 2.0 %    Immature Granulocytes 0 0.0 - 5.0 %    Segs Absolute 6.1 1.7 - 8.2 K/UL    Absolute Lymph # 0.9 0.5 - 4.6 K/UL    Absolute Mono # 0.4 0.1 - 1.3 K/UL    Absolute Eos # 0.2 0.0 - 0.8 K/UL    Basophils Absolute 0.0 0.0 - 0.2 K/UL    Absolute Immature Granulocyte 0.0 0.0 - 0.5 K/UL   Basic Metabolic Panel    Collection Time: 03/06/23  9:10 AM   Result Value Ref Range    Sodium 135 133 - 143 mmol/L    Potassium 3.1 (L) 3.5 - 5.1 mmol/L    Chloride 102 101 - 110 mmol/L    CO2 31 21 - 32 mmol/L    Anion Gap 2 2 - 11 mmol/L    Glucose 101 (H) 65 - 100 mg/dL    BUN 17 8 - 23 MG/DL    Creatinine 0.76 0.6 - 1.0 MG/DL    Est, Glom Filt Rate >60 >60 ml/min/1.73m2    Calcium 9.7 8.3 - 10.4 MG/DL   APTT    Collection Time: 03/06/23  9:10 AM   Result Value Ref Range    PTT 32.6 24.5 - 34.2 SEC   MSSA/MRSA Screen BY PCR    Collection Time: 03/06/23  9:10 AM    Specimen: Nares; Swab   Result Value Ref Range    Special Requests NO SPECIAL REQUESTS      Culture        SA target not detected.                                  A MRSA NEGATIVE, SA NEGATIVE test result does not preclude MRSA or SA nasal colonization. Albumin    Collection Time: 03/06/23  9:10 AM   Result Value Ref Range    Albumin 3.8 3.2 - 4.6 g/dL         Problem List:  )  Patient Active Problem List   Diagnosis    Primary osteoarthritis of left knee    Acquired deformity of left knee    Noncompliance with CPAP treatment    Osteoarthritis of left knee, unspecified osteoarthritis type    Primary osteoarthritis of right knee    Acquired deformity of right knee       Total Joint Surgery Pre-Assessment Recommendations:           Continuous saturation monitoring during hospitalization. O2 prn per respiratory protocol.    Repeat potassium DOS  Signed By: ROBYN Schumacher - CNP-C    March 6, 2023

## 2023-03-07 LAB
COMMENT:: NORMAL
COTININE UR QL SCN: NEGATIVE NG/ML

## 2023-03-08 NOTE — PROGRESS NOTES
Nicotine Metabolites, Urine  Order: [id number]  Status: Final result    Visible to patient: Yes (not seen)    Next appt: 03/21/2023 at 02:40 PM in Orthopedic Surgery Mari House MD)    Dx: Primary osteoarthritis of right knee    0 Result Notes        Component Ref Range & Units 3/6/23 0910 11/28/22 1230   Cotinine Screen, Ur Pimejd=897 ng/mL Negative [1]  Negative [1]    Comment:   Comment [2]  Comment CM[2]    Comment: (NOTE)   This analysis is performed by immunoassay. Positive findings are   unconfirmed analytical test results; if results do not support   expected clinical finding, confirmation by an alternate methodology   is recommended. Patient metabolic variables, specific drug chemistry,   and specimen characteristics can affect test outcome. Technical consultation is available at [email or web address], or   call toll free [phone].    Performed At: [facility] & [address] [id number]   Darren Lerma MD [place]:[id number]   Performed At: [facility]   [facility] [address] [place], [place] [id number]   Tariq Lord PhD GS:[id number]    [address] [place] [id number] Nahid Jackson [facility] [place] [facility]              Specimen Collected: 03/06/23 09:10 EST Last Resulted: 03/07/23 21:35 EST

## 2023-03-09 DIAGNOSIS — M21.961 ACQUIRED DEFORMITY OF RIGHT KNEE: ICD-10-CM

## 2023-03-09 DIAGNOSIS — M17.11 PRIMARY OSTEOARTHRITIS OF RIGHT KNEE: ICD-10-CM

## 2023-03-20 DIAGNOSIS — G89.18 POSTOPERATIVE PAIN: Primary | ICD-10-CM

## 2023-03-20 RX ORDER — GABAPENTIN 300 MG/1
300 CAPSULE ORAL 2 TIMES DAILY
Qty: 30 CAPSULE | Refills: 0 | Status: SHIPPED | OUTPATIENT
Start: 2023-03-20 | End: 2023-04-04

## 2023-03-20 RX ORDER — SENNA PLUS 8.6 MG/1
1 TABLET ORAL 2 TIMES DAILY
Qty: 30 TABLET | Refills: 2 | Status: SHIPPED | OUTPATIENT
Start: 2023-03-20

## 2023-03-20 RX ORDER — ACETAMINOPHEN 500 MG
1000 TABLET ORAL EVERY 6 HOURS PRN
Qty: 180 TABLET | Refills: 2 | Status: SHIPPED | OUTPATIENT
Start: 2023-03-20

## 2023-03-20 RX ORDER — OMEPRAZOLE 40 MG/1
40 CAPSULE, DELAYED RELEASE ORAL DAILY
Qty: 30 CAPSULE | Refills: 0 | Status: SHIPPED | OUTPATIENT
Start: 2023-03-20

## 2023-03-20 RX ORDER — ONDANSETRON 4 MG/1
4 TABLET, FILM COATED ORAL 3 TIMES DAILY PRN
Qty: 30 TABLET | Refills: 1 | Status: SHIPPED | OUTPATIENT
Start: 2023-03-20

## 2023-03-20 RX ORDER — CYCLOBENZAPRINE HCL 5 MG
5 TABLET ORAL 3 TIMES DAILY PRN
Qty: 30 TABLET | Refills: 0 | Status: SHIPPED | OUTPATIENT
Start: 2023-03-20 | End: 2023-03-30

## 2023-03-20 RX ORDER — ZOLPIDEM TARTRATE 10 MG/1
5 TABLET ORAL NIGHTLY PRN
Qty: 30 TABLET | Refills: 0 | Status: SHIPPED | OUTPATIENT
Start: 2023-03-20 | End: 2023-04-19

## 2023-03-20 RX ORDER — MELOXICAM 15 MG/1
15 TABLET ORAL DAILY
Qty: 30 TABLET | Refills: 2 | Status: SHIPPED | OUTPATIENT
Start: 2023-03-20

## 2023-03-20 RX ORDER — HYDROMORPHONE HYDROCHLORIDE 2 MG/1
2 TABLET ORAL EVERY 4 HOURS PRN
Qty: 40 TABLET | Refills: 0 | Status: SHIPPED | OUTPATIENT
Start: 2023-03-20 | End: 2023-03-27

## 2023-03-20 RX ORDER — ASPIRIN 81 MG/1
81 TABLET ORAL 2 TIMES DAILY
Qty: 60 TABLET | Refills: 0 | Status: SHIPPED | OUTPATIENT
Start: 2023-03-20

## 2023-03-21 ENCOUNTER — OFFICE VISIT (OUTPATIENT)
Dept: ORTHOPEDIC SURGERY | Age: 68
End: 2023-03-21

## 2023-03-21 DIAGNOSIS — M25.561 RIGHT KNEE PAIN, UNSPECIFIED CHRONICITY: Primary | ICD-10-CM

## 2023-03-21 NOTE — PROGRESS NOTES
MD   meloxicam (MOBIC) 15 MG tablet Take 1 tablet by mouth daily  Patient not taking: Reported on 3/6/2023 12/12/22   Dontrell Lilly MD   omeprazole (PRILOSEC) 40 MG delayed release capsule Take 1 capsule by mouth daily  Patient not taking: No sig reported 12/12/22   Dontrell Lilly MD   senna (SENOKOT) 8.6 MG tablet Take 1 tablet by mouth 2 times daily  Patient not taking: No sig reported 12/12/22   Dontrell Lilly MD   ondansetron (ZOFRAN) 4 MG tablet Take 1 tablet by mouth 3 times daily as needed for Nausea or Vomiting  Patient not taking: No sig reported 12/12/22   Dontrell Lilly MD   ZINC PO Take by mouth  Patient not taking: Reported on 3/6/2023    Historical Provider, MD   lisinopril-hydroCHLOROthiazide (PRINZIDE;ZESTORETIC) 10-12.5 MG per tablet  5/3/22   Historical Provider, MD   rosuvastatin (CRESTOR) 10 MG tablet Take 40 mg by mouth daily 5/3/22   Historical Provider, MD       Family History:   No family history on file. Social History:      Social History     Tobacco Use    Smoking status: Never    Smokeless tobacco: Never   Substance Use Topics    Alcohol use: Never         Allergies: Allergies   Allergen Reactions    Codeine      Other reaction(s): Nausea and/or vomiting-Intolerance  Passing out, tramadol ok        Vitals: There were no vitals taken for this visit. Objective:         General: No Acute distress                   HEENT: Normocephalic/atramatic                   Lungs:  Breathing non-labored                   Heart:   RRR                    Abdomen: soft       Extremities:  Prior exam done in office has been consistent with end-stage knee arthritis. We have noted pain with ROM of the right knee. Trace effusion. Crepitus present. Distally the patient shows no neurologic deficit. Antalgic gait appreciated.      Meds:   Current Outpatient Medications   Medication Sig    acetaminophen (TYLENOL) 500 MG tablet Take 2 tablets by mouth every 6 hours as needed for Pain    zolpidem

## 2023-03-26 ENCOUNTER — ANESTHESIA EVENT (OUTPATIENT)
Dept: SURGERY | Age: 68
End: 2023-03-26
Payer: MEDICARE

## 2023-03-27 ENCOUNTER — HOME HEALTH ADMISSION (OUTPATIENT)
Dept: HOME HEALTH SERVICES | Facility: HOME HEALTH | Age: 68
End: 2023-03-27
Payer: MEDICARE

## 2023-03-27 ENCOUNTER — HOSPITAL ENCOUNTER (OUTPATIENT)
Age: 68
Discharge: HOME | End: 2023-03-27
Attending: ORTHOPAEDIC SURGERY | Admitting: ORTHOPAEDIC SURGERY
Payer: MEDICARE

## 2023-03-27 ENCOUNTER — ANESTHESIA (OUTPATIENT)
Dept: SURGERY | Age: 68
End: 2023-03-27
Payer: MEDICARE

## 2023-03-27 VITALS
WEIGHT: 213.5 LBS | DIASTOLIC BLOOD PRESSURE: 78 MMHG | TEMPERATURE: 98 F | BODY MASS INDEX: 36.45 KG/M2 | RESPIRATION RATE: 18 BRPM | HEIGHT: 64 IN | HEART RATE: 89 BPM | SYSTOLIC BLOOD PRESSURE: 122 MMHG | OXYGEN SATURATION: 99 %

## 2023-03-27 DIAGNOSIS — M21.961 ACQUIRED DEFORMITY OF RIGHT KNEE: ICD-10-CM

## 2023-03-27 DIAGNOSIS — M17.11 PRIMARY OSTEOARTHRITIS OF RIGHT KNEE: ICD-10-CM

## 2023-03-27 PROCEDURE — 3700000000 HC ANESTHESIA ATTENDED CARE: Performed by: ORTHOPAEDIC SURGERY

## 2023-03-27 PROCEDURE — 97535 SELF CARE MNGMENT TRAINING: CPT

## 2023-03-27 PROCEDURE — 7100000000 HC PACU RECOVERY - FIRST 15 MIN: Performed by: ORTHOPAEDIC SURGERY

## 2023-03-27 PROCEDURE — 27447 TOTAL KNEE ARTHROPLASTY: CPT | Performed by: ORTHOPAEDIC SURGERY

## 2023-03-27 PROCEDURE — 2500000003 HC RX 250 WO HCPCS: Performed by: REGISTERED NURSE

## 2023-03-27 PROCEDURE — C1776 JOINT DEVICE (IMPLANTABLE): HCPCS | Performed by: ORTHOPAEDIC SURGERY

## 2023-03-27 PROCEDURE — 6360000002 HC RX W HCPCS: Performed by: ORTHOPAEDIC SURGERY

## 2023-03-27 PROCEDURE — 6370000000 HC RX 637 (ALT 250 FOR IP): Performed by: NURSE PRACTITIONER

## 2023-03-27 PROCEDURE — 6370000000 HC RX 637 (ALT 250 FOR IP): Performed by: ANESTHESIOLOGY

## 2023-03-27 PROCEDURE — 97161 PT EVAL LOW COMPLEX 20 MIN: CPT

## 2023-03-27 PROCEDURE — 97530 THERAPEUTIC ACTIVITIES: CPT

## 2023-03-27 PROCEDURE — 6360000002 HC RX W HCPCS: Performed by: NURSE PRACTITIONER

## 2023-03-27 PROCEDURE — 20985 CPTR-ASST DIR MS PX: CPT | Performed by: ORTHOPAEDIC SURGERY

## 2023-03-27 PROCEDURE — 6360000002 HC RX W HCPCS: Performed by: ANESTHESIOLOGY

## 2023-03-27 PROCEDURE — 2720000010 HC SURG SUPPLY STERILE: Performed by: ORTHOPAEDIC SURGERY

## 2023-03-27 PROCEDURE — 2580000003 HC RX 258: Performed by: ANESTHESIOLOGY

## 2023-03-27 PROCEDURE — 7100000001 HC PACU RECOVERY - ADDTL 15 MIN: Performed by: ORTHOPAEDIC SURGERY

## 2023-03-27 PROCEDURE — 2500000003 HC RX 250 WO HCPCS: Performed by: ANESTHESIOLOGY

## 2023-03-27 PROCEDURE — 64447 NJX AA&/STRD FEMORAL NRV IMG: CPT | Performed by: ANESTHESIOLOGY

## 2023-03-27 PROCEDURE — 3600000005 HC SURGERY LEVEL 5 BASE: Performed by: ORTHOPAEDIC SURGERY

## 2023-03-27 PROCEDURE — 3600000015 HC SURGERY LEVEL 5 ADDTL 15MIN: Performed by: ORTHOPAEDIC SURGERY

## 2023-03-27 PROCEDURE — 3700000001 HC ADD 15 MINUTES (ANESTHESIA): Performed by: ORTHOPAEDIC SURGERY

## 2023-03-27 PROCEDURE — 2709999900 HC NON-CHARGEABLE SUPPLY: Performed by: ORTHOPAEDIC SURGERY

## 2023-03-27 PROCEDURE — 97165 OT EVAL LOW COMPLEX 30 MIN: CPT

## 2023-03-27 PROCEDURE — C1713 ANCHOR/SCREW BN/BN,TIS/BN: HCPCS | Performed by: ORTHOPAEDIC SURGERY

## 2023-03-27 PROCEDURE — 94760 N-INVAS EAR/PLS OXIMETRY 1: CPT

## 2023-03-27 PROCEDURE — 6360000002 HC RX W HCPCS: Performed by: REGISTERED NURSE

## 2023-03-27 DEVICE — TIBIAL COMPONENT
Type: IMPLANTABLE DEVICE | Site: KNEE | Status: FUNCTIONAL
Brand: TRIATHLON

## 2023-03-27 DEVICE — COMPONENT TOT KNEE CAPPED PRIMARY K2STRYKER] STRYKER CORP]: Type: IMPLANTABLE DEVICE | Status: FUNCTIONAL

## 2023-03-27 DEVICE — TIBIAL BEARING INSERT - CS
Type: IMPLANTABLE DEVICE | Site: KNEE | Status: FUNCTIONAL
Brand: TRIATHLON

## 2023-03-27 DEVICE — PATELLA
Type: IMPLANTABLE DEVICE | Site: PATELLA | Status: FUNCTIONAL
Brand: TRIATHLON

## 2023-03-27 DEVICE — CRUCIATE RETAINING FEMORAL
Type: IMPLANTABLE DEVICE | Site: KNEE | Status: FUNCTIONAL
Brand: TRIATHLON

## 2023-03-27 RX ORDER — TRANEXAMIC ACID 650 MG/1
1300 TABLET ORAL
Status: DISCONTINUED | OUTPATIENT
Start: 2023-03-27 | End: 2023-03-27 | Stop reason: HOSPADM

## 2023-03-27 RX ORDER — LISINOPRIL 5 MG/1
10 TABLET ORAL DAILY
Status: DISCONTINUED | OUTPATIENT
Start: 2023-03-28 | End: 2023-03-27 | Stop reason: HOSPADM

## 2023-03-27 RX ORDER — SODIUM CHLORIDE 9 MG/ML
INJECTION, SOLUTION INTRAVENOUS CONTINUOUS
Status: DISCONTINUED | OUTPATIENT
Start: 2023-03-27 | End: 2023-03-27 | Stop reason: HOSPADM

## 2023-03-27 RX ORDER — LIDOCAINE HYDROCHLORIDE 20 MG/ML
INJECTION, SOLUTION EPIDURAL; INFILTRATION; INTRACAUDAL; PERINEURAL PRN
Status: DISCONTINUED | OUTPATIENT
Start: 2023-03-27 | End: 2023-03-27 | Stop reason: SDUPTHER

## 2023-03-27 RX ORDER — SODIUM CHLORIDE 0.9 % (FLUSH) 0.9 %
5-40 SYRINGE (ML) INJECTION EVERY 12 HOURS SCHEDULED
Status: DISCONTINUED | OUTPATIENT
Start: 2023-03-27 | End: 2023-03-27 | Stop reason: HOSPADM

## 2023-03-27 RX ORDER — ROPIVACAINE HYDROCHLORIDE 2 MG/ML
INJECTION, SOLUTION EPIDURAL; INFILTRATION; PERINEURAL
Status: DISCONTINUED | OUTPATIENT
Start: 2023-03-27 | End: 2023-03-27 | Stop reason: SDUPTHER

## 2023-03-27 RX ORDER — KETOROLAC TROMETHAMINE 30 MG/ML
INJECTION, SOLUTION INTRAMUSCULAR; INTRAVENOUS PRN
Status: DISCONTINUED | OUTPATIENT
Start: 2023-03-27 | End: 2023-03-27 | Stop reason: HOSPADM

## 2023-03-27 RX ORDER — DEXAMETHASONE SODIUM PHOSPHATE 10 MG/ML
10 INJECTION INTRAMUSCULAR; INTRAVENOUS ONCE
Status: DISCONTINUED | OUTPATIENT
Start: 2023-03-28 | End: 2023-03-27 | Stop reason: HOSPADM

## 2023-03-27 RX ORDER — SODIUM CHLORIDE 0.9 % (FLUSH) 0.9 %
5-40 SYRINGE (ML) INJECTION PRN
Status: DISCONTINUED | OUTPATIENT
Start: 2023-03-27 | End: 2023-03-27 | Stop reason: HOSPADM

## 2023-03-27 RX ORDER — ONDANSETRON 8 MG/1
8 TABLET, ORALLY DISINTEGRATING ORAL EVERY 8 HOURS PRN
Status: DISCONTINUED | OUTPATIENT
Start: 2023-03-27 | End: 2023-03-27 | Stop reason: HOSPADM

## 2023-03-27 RX ORDER — ROPIVACAINE HYDROCHLORIDE 2 MG/ML
INJECTION, SOLUTION EPIDURAL; INFILTRATION; PERINEURAL PRN
Status: DISCONTINUED | OUTPATIENT
Start: 2023-03-27 | End: 2023-03-27 | Stop reason: HOSPADM

## 2023-03-27 RX ORDER — SENNA AND DOCUSATE SODIUM 50; 8.6 MG/1; MG/1
1 TABLET, FILM COATED ORAL 2 TIMES DAILY
Status: DISCONTINUED | OUTPATIENT
Start: 2023-03-27 | End: 2023-03-27 | Stop reason: HOSPADM

## 2023-03-27 RX ORDER — HYDROMORPHONE HYDROCHLORIDE 1 MG/ML
1 INJECTION, SOLUTION INTRAMUSCULAR; INTRAVENOUS; SUBCUTANEOUS
Status: DISCONTINUED | OUTPATIENT
Start: 2023-03-27 | End: 2023-03-27 | Stop reason: HOSPADM

## 2023-03-27 RX ORDER — DIPHENHYDRAMINE HYDROCHLORIDE 50 MG/ML
25 INJECTION INTRAMUSCULAR; INTRAVENOUS EVERY 6 HOURS PRN
Status: DISCONTINUED | OUTPATIENT
Start: 2023-03-27 | End: 2023-03-27 | Stop reason: HOSPADM

## 2023-03-27 RX ORDER — LISINOPRIL AND HYDROCHLOROTHIAZIDE 12.5; 1 MG/1; MG/1
1 TABLET ORAL DAILY
Status: DISCONTINUED | OUTPATIENT
Start: 2023-03-27 | End: 2023-03-27

## 2023-03-27 RX ORDER — ONDANSETRON 2 MG/ML
4 INJECTION INTRAMUSCULAR; INTRAVENOUS
Status: DISCONTINUED | OUTPATIENT
Start: 2023-03-27 | End: 2023-03-27 | Stop reason: HOSPADM

## 2023-03-27 RX ORDER — OXYCODONE HCL 10 MG/1
10 TABLET, FILM COATED, EXTENDED RELEASE ORAL EVERY 12 HOURS SCHEDULED
Status: DISCONTINUED | OUTPATIENT
Start: 2023-03-27 | End: 2023-03-27 | Stop reason: HOSPADM

## 2023-03-27 RX ORDER — OXYCODONE HYDROCHLORIDE 5 MG/1
5 TABLET ORAL PRN
Status: DISCONTINUED | OUTPATIENT
Start: 2023-03-27 | End: 2023-03-27 | Stop reason: HOSPADM

## 2023-03-27 RX ORDER — PANTOPRAZOLE SODIUM 40 MG/1
40 TABLET, DELAYED RELEASE ORAL
Status: DISCONTINUED | OUTPATIENT
Start: 2023-03-28 | End: 2023-03-27 | Stop reason: HOSPADM

## 2023-03-27 RX ORDER — SODIUM CHLORIDE 9 MG/ML
INJECTION, SOLUTION INTRAVENOUS PRN
Status: DISCONTINUED | OUTPATIENT
Start: 2023-03-27 | End: 2023-03-27 | Stop reason: HOSPADM

## 2023-03-27 RX ORDER — ZOLPIDEM TARTRATE 5 MG/1
5 TABLET ORAL NIGHTLY PRN
Status: DISCONTINUED | OUTPATIENT
Start: 2023-03-27 | End: 2023-03-27 | Stop reason: HOSPADM

## 2023-03-27 RX ORDER — CYCLOBENZAPRINE HCL 5 MG
5 TABLET ORAL 3 TIMES DAILY PRN
Status: DISCONTINUED | OUTPATIENT
Start: 2023-03-27 | End: 2023-03-27 | Stop reason: HOSPADM

## 2023-03-27 RX ORDER — OXYCODONE HYDROCHLORIDE 5 MG/1
10 TABLET ORAL PRN
Status: DISCONTINUED | OUTPATIENT
Start: 2023-03-27 | End: 2023-03-27 | Stop reason: HOSPADM

## 2023-03-27 RX ORDER — ASPIRIN 81 MG/1
81 TABLET ORAL 2 TIMES DAILY
Status: DISCONTINUED | OUTPATIENT
Start: 2023-03-27 | End: 2023-03-27 | Stop reason: HOSPADM

## 2023-03-27 RX ORDER — SODIUM CHLORIDE, SODIUM LACTATE, POTASSIUM CHLORIDE, CALCIUM CHLORIDE 600; 310; 30; 20 MG/100ML; MG/100ML; MG/100ML; MG/100ML
INJECTION, SOLUTION INTRAVENOUS CONTINUOUS
Status: DISCONTINUED | OUTPATIENT
Start: 2023-03-27 | End: 2023-03-27 | Stop reason: HOSPADM

## 2023-03-27 RX ORDER — DEXAMETHASONE SODIUM PHOSPHATE 10 MG/ML
INJECTION INTRAMUSCULAR; INTRAVENOUS PRN
Status: DISCONTINUED | OUTPATIENT
Start: 2023-03-27 | End: 2023-03-27 | Stop reason: SDUPTHER

## 2023-03-27 RX ORDER — DIPHENHYDRAMINE HCL 25 MG
25 CAPSULE ORAL EVERY 6 HOURS PRN
Status: DISCONTINUED | OUTPATIENT
Start: 2023-03-27 | End: 2023-03-27 | Stop reason: HOSPADM

## 2023-03-27 RX ORDER — TRANEXAMIC ACID 100 MG/ML
INJECTION, SOLUTION INTRAVENOUS PRN
Status: DISCONTINUED | OUTPATIENT
Start: 2023-03-27 | End: 2023-03-27 | Stop reason: SDUPTHER

## 2023-03-27 RX ORDER — KETOROLAC TROMETHAMINE 15 MG/ML
15 INJECTION, SOLUTION INTRAMUSCULAR; INTRAVENOUS EVERY 8 HOURS
Status: DISCONTINUED | OUTPATIENT
Start: 2023-03-27 | End: 2023-03-27 | Stop reason: HOSPADM

## 2023-03-27 RX ORDER — LIDOCAINE HYDROCHLORIDE 10 MG/ML
1 INJECTION, SOLUTION INFILTRATION; PERINEURAL
Status: DISCONTINUED | OUTPATIENT
Start: 2023-03-27 | End: 2023-03-27 | Stop reason: HOSPADM

## 2023-03-27 RX ORDER — NALOXONE HYDROCHLORIDE 0.4 MG/ML
0.4 INJECTION, SOLUTION INTRAMUSCULAR; INTRAVENOUS; SUBCUTANEOUS PRN
Status: DISCONTINUED | OUTPATIENT
Start: 2023-03-27 | End: 2023-03-27 | Stop reason: HOSPADM

## 2023-03-27 RX ORDER — HYDROCHLOROTHIAZIDE 25 MG/1
12.5 TABLET ORAL DAILY
Status: DISCONTINUED | OUTPATIENT
Start: 2023-03-28 | End: 2023-03-27 | Stop reason: HOSPADM

## 2023-03-27 RX ORDER — ACETAMINOPHEN 500 MG
1000 TABLET ORAL ONCE
Status: COMPLETED | OUTPATIENT
Start: 2023-03-27 | End: 2023-03-27

## 2023-03-27 RX ORDER — HYDROMORPHONE HYDROCHLORIDE 2 MG/1
2 TABLET ORAL EVERY 4 HOURS PRN
Status: DISCONTINUED | OUTPATIENT
Start: 2023-03-27 | End: 2023-03-27 | Stop reason: HOSPADM

## 2023-03-27 RX ORDER — MIDAZOLAM HYDROCHLORIDE 1 MG/ML
2 INJECTION INTRAMUSCULAR; INTRAVENOUS
Status: COMPLETED | OUTPATIENT
Start: 2023-03-27 | End: 2023-03-27

## 2023-03-27 RX ORDER — DIPHENHYDRAMINE HYDROCHLORIDE 50 MG/ML
12.5 INJECTION INTRAMUSCULAR; INTRAVENOUS
Status: DISCONTINUED | OUTPATIENT
Start: 2023-03-27 | End: 2023-03-27 | Stop reason: HOSPADM

## 2023-03-27 RX ORDER — GABAPENTIN 300 MG/1
300 CAPSULE ORAL 2 TIMES DAILY
Status: DISCONTINUED | OUTPATIENT
Start: 2023-03-27 | End: 2023-03-27 | Stop reason: HOSPADM

## 2023-03-27 RX ORDER — ONDANSETRON 2 MG/ML
4 INJECTION INTRAMUSCULAR; INTRAVENOUS EVERY 6 HOURS PRN
Status: DISCONTINUED | OUTPATIENT
Start: 2023-03-27 | End: 2023-03-27 | Stop reason: HOSPADM

## 2023-03-27 RX ORDER — ONDANSETRON 2 MG/ML
INJECTION INTRAMUSCULAR; INTRAVENOUS PRN
Status: DISCONTINUED | OUTPATIENT
Start: 2023-03-27 | End: 2023-03-27 | Stop reason: SDUPTHER

## 2023-03-27 RX ORDER — PROPOFOL 10 MG/ML
INJECTION, EMULSION INTRAVENOUS PRN
Status: DISCONTINUED | OUTPATIENT
Start: 2023-03-27 | End: 2023-03-27 | Stop reason: SDUPTHER

## 2023-03-27 RX ORDER — FENTANYL CITRATE 50 UG/ML
INJECTION, SOLUTION INTRAMUSCULAR; INTRAVENOUS PRN
Status: DISCONTINUED | OUTPATIENT
Start: 2023-03-27 | End: 2023-03-27 | Stop reason: SDUPTHER

## 2023-03-27 RX ORDER — PROCHLORPERAZINE EDISYLATE 5 MG/ML
5 INJECTION INTRAMUSCULAR; INTRAVENOUS
Status: DISCONTINUED | OUTPATIENT
Start: 2023-03-27 | End: 2023-03-27 | Stop reason: HOSPADM

## 2023-03-27 RX ORDER — ACETAMINOPHEN 500 MG
1000 TABLET ORAL EVERY 6 HOURS
Status: DISCONTINUED | OUTPATIENT
Start: 2023-03-27 | End: 2023-03-27 | Stop reason: HOSPADM

## 2023-03-27 RX ORDER — SODIUM CHLORIDE, SODIUM LACTATE, POTASSIUM CHLORIDE, CALCIUM CHLORIDE 600; 310; 30; 20 MG/100ML; MG/100ML; MG/100ML; MG/100ML
INJECTION, SOLUTION INTRAVENOUS CONTINUOUS
Status: DISCONTINUED | OUTPATIENT
Start: 2023-03-27 | End: 2023-03-27

## 2023-03-27 RX ORDER — ROSUVASTATIN CALCIUM 20 MG/1
40 TABLET, COATED ORAL DAILY
Status: DISCONTINUED | OUTPATIENT
Start: 2023-03-28 | End: 2023-03-27 | Stop reason: HOSPADM

## 2023-03-27 RX ORDER — MELOXICAM 7.5 MG/1
7.5 TABLET ORAL 2 TIMES DAILY
Status: DISCONTINUED | OUTPATIENT
Start: 2023-03-30 | End: 2023-03-27 | Stop reason: HOSPADM

## 2023-03-27 RX ORDER — ONDANSETRON 4 MG/1
4 TABLET, ORALLY DISINTEGRATING ORAL EVERY 8 HOURS PRN
Status: DISCONTINUED | OUTPATIENT
Start: 2023-03-27 | End: 2023-03-27 | Stop reason: HOSPADM

## 2023-03-27 RX ADMIN — ACETAMINOPHEN 1000 MG: 500 TABLET, FILM COATED ORAL at 06:54

## 2023-03-27 RX ADMIN — SODIUM CHLORIDE, SODIUM LACTATE, POTASSIUM CHLORIDE, AND CALCIUM CHLORIDE: 600; 310; 30; 20 INJECTION, SOLUTION INTRAVENOUS at 07:08

## 2023-03-27 RX ADMIN — ACETAMINOPHEN 1000 MG: 500 TABLET, FILM COATED ORAL at 11:26

## 2023-03-27 RX ADMIN — FENTANYL CITRATE 100 MCG: 50 INJECTION, SOLUTION INTRAMUSCULAR; INTRAVENOUS at 08:25

## 2023-03-27 RX ADMIN — DEXAMETHASONE SODIUM PHOSPHATE 5 MG: 10 INJECTION INTRAMUSCULAR; INTRAVENOUS at 08:09

## 2023-03-27 RX ADMIN — DEXAMETHASONE SODIUM PHOSPHATE 4 MG: 4 INJECTION, SOLUTION INTRAMUSCULAR; INTRAVENOUS at 07:37

## 2023-03-27 RX ADMIN — PROPOFOL 75 MCG/KG/MIN: 10 INJECTION, EMULSION INTRAVENOUS at 08:07

## 2023-03-27 RX ADMIN — LIDOCAINE HYDROCHLORIDE 30 MG: 20 INJECTION, SOLUTION EPIDURAL; INFILTRATION; INTRACAUDAL; PERINEURAL at 08:06

## 2023-03-27 RX ADMIN — SODIUM CHLORIDE, SODIUM LACTATE, POTASSIUM CHLORIDE, AND CALCIUM CHLORIDE: 600; 310; 30; 20 INJECTION, SOLUTION INTRAVENOUS at 09:01

## 2023-03-27 RX ADMIN — PROPOFOL 70 MG: 10 INJECTION, EMULSION INTRAVENOUS at 08:06

## 2023-03-27 RX ADMIN — ROPIVACAINE HYDROCHLORIDE 20 ML: 2 INJECTION, SOLUTION EPIDURAL; INFILTRATION at 07:37

## 2023-03-27 RX ADMIN — MEPIVACAINE HYDROCHLORIDE 50 MG: 20 INJECTION, SOLUTION EPIDURAL; INFILTRATION at 07:59

## 2023-03-27 RX ADMIN — Medication 2000 MG: at 08:00

## 2023-03-27 RX ADMIN — TRANEXAMIC ACID 1300 MG: 650 TABLET ORAL at 11:26

## 2023-03-27 RX ADMIN — ONDANSETRON 4 MG: 2 INJECTION INTRAMUSCULAR; INTRAVENOUS at 08:09

## 2023-03-27 RX ADMIN — TRANEXAMIC ACID 1000 MG: 100 INJECTION, SOLUTION INTRAVENOUS at 08:03

## 2023-03-27 RX ADMIN — MIDAZOLAM HYDROCHLORIDE 2 MG: 1 INJECTION, SOLUTION INTRAMUSCULAR; INTRAVENOUS at 07:38

## 2023-03-27 ASSESSMENT — KOOS JR
BENDING TO THE FLOOR TO PICK UP OBJECT: 2
TWISING OR PIVOTING ON KNEE: 2
STANDING UPRIGHT: 2
RISING FROM SITTING: 2
KOOS JR TOTAL INTERVAL SCORE: 52.465
HOW SEVERE IS YOUR KNEE STIFFNESS AFTER FIRST WAKING IN MORNING: 2
STRAIGHTENING KNEE FULLY: 2
GOING UP OR DOWN STAIRS: 2

## 2023-03-27 ASSESSMENT — PAIN SCALES - GENERAL: PAINLEVEL_OUTOF10: 0

## 2023-03-27 ASSESSMENT — PAIN - FUNCTIONAL ASSESSMENT: PAIN_FUNCTIONAL_ASSESSMENT: 0-10

## 2023-03-27 NOTE — ANESTHESIA PROCEDURE NOTES
Spinal Block    Patient location during procedure: OR  End time: 3/27/2023 8:04 AM  Reason for block: primary anesthetic  Staffing  Performed: anesthesiologist   Anesthesiologist: Ara Coreas MD  Spinal Block  Patient position: sitting  Prep: ChloraPrep  Patient monitoring: cardiac monitor, continuous pulse ox, frequent blood pressure checks and oxygen  Approach: midline  Location: L4/L5  Provider prep: mask and sterile gloves  Needle  Needle type: pencil-tip   Needle gauge: 25 G  Needle length: 3.5 in  Assessment  Sensory level: T8  Swirl obtained: Yes  CSF: clear  Attempts: 1  Hemodynamics: stable  Additional Notes  Spinal time out at 0759  Preanesthetic Checklist  Completed: patient identified, IV checked, risks and benefits discussed, surgical/procedural consents, equipment checked, pre-op evaluation, timeout performed, anesthesia consent given, oxygen available and monitors applied/VS acknowledged
AM

## 2023-03-27 NOTE — ANESTHESIA POSTPROCEDURE EVALUATION
Department of Anesthesiology  Postprocedure Note    Patient: Brittny Acevedo  MRN: [id number]  YOB: 1955  Date of evaluation: 3/27/2023      Procedure Summary     Date: 03/27/23 Room / Location: [place] [facility] MAIN OR 06 / E MAIN OR    Anesthesia Start: 5436 Anesthesia Stop: 4225    Procedure: Right KNEE TOTAL ARTHROPLASTY ROBOTIC Dilcia DILAN/ SDD (Right: Knee) Diagnosis:       Primary osteoarthritis of right knee      Acquired deformity of right knee      (Primary osteoarthritis of right knee [M17.11])      (Acquired deformity of right knee [M21.961])    Surgeons: Damaris Hicks MD Responsible Provider: Jamia Shaw MD    Anesthesia Type: spinal ASA Status: 2          Anesthesia Type: No value filed.     Mathew Phase I: Mathew Score: 9    Mathew Phase II:        Anesthesia Post Evaluation    Patient location during evaluation: PACU  Patient participation: complete - patient participated  Level of consciousness: awake and alert  Airway patency: patent  Nausea & Vomiting: no nausea and no vomiting  Complications: no  Cardiovascular status: hemodynamically stable  Respiratory status: acceptable, nonlabored ventilation and spontaneous ventilation  Hydration status: euvolemic  Comments: /78   Pulse 71   Temp 98 °F (36.7 °C) (Temporal)   Resp 18   Ht 5' 4\" (1.626 m)   Wt 213 lb 8 oz (96.8 kg)   SpO2 96%   BMI 36.65 kg/m²     Multimodal analgesia pain management approach

## 2023-03-27 NOTE — DISCHARGE INSTRUCTIONS
your medicine safely. Blood thinners can cause serious bleeding problems. This medicine could be in pill form or as a shot (injection). If a shot is needed, your doctor will tell you how to do this. Be safe with medicines. Take pain medicines exactly as directed. If the doctor gave you a prescription medicine for pain, take it as prescribed. If you are not taking a prescription pain medicine, ask your doctor if you can take an over-the-counter medicine. Plan to take your pain medicine 30 minutes before exercises. It is easier to prevent pain before it starts than to stop it after it has started. If you think your pain medicine is making you sick to your stomach: Take your medicine after meals (unless your doctor has told you not to). Ask your doctor for a different pain medicine. If your doctor prescribed antibiotics, take them as directed. Do not stop taking them just because you feel better. You need to take the full course of antibiotics. Incision care    If your doctor told you how to care for your cut (incision), follow your doctor's instructions. You will have a dressing over the cut. A dressing helps the incision heal and protects it. Your doctor will tell you how to take care of this. If you did not get instructions, follow this general advice: If you have strips of tape on the cut the doctor made, leave the tape on for a week or until it falls off. If you have stitches or staples, your doctor will tell you when to come back to have them removed. If you have skin glue on the cut, leave it on until it falls off. Skin glue is also called skin adhesive or liquid stitches. Change the bandage every day. Wash the area daily with warm water, and pat it dry. Don't use hydrogen peroxide or alcohol. They can slow healing. You may cover the area with a gauze bandage if it oozes fluid or rubs against clothing. You may shower 24 to 48 hours after surgery. Pat the incision dry.  Don't swim or

## 2023-03-27 NOTE — PERIOP NOTE
TRANSFER - OUT REPORT:    Verbal report given to WILL Nixon on Walt Chill  being transferred to [facility] for routine post-op       Report consisted of patient's Situation, Background, Assessment and   Recommendations(SBAR). Information from the following report(s) Nurse Handoff Report and Cardiac Rhythm SR  was reviewed with the receiving nurse. [place] Assessment: No data recorded  Lines:   Peripheral IV 03/27/23 Right Hand (Active)   Site Assessment Clean, dry & intact 03/27/23 0940   Line Status Infusing 03/27/23 0940   Phlebitis Assessment No symptoms 03/27/23 0940   Infiltration Assessment 0 03/27/23 0940   Alcohol Cap Used No 03/27/23 0940   Dressing Status Clean, dry & intact 03/27/23 0940   Dressing Type Transparent 03/27/23 0940        Opportunity for questions and clarification was provided.       Patient transported with:  O2 @ room air lpm

## 2023-03-27 NOTE — OP NOTE
of both the medial and lateral compartments. The patella was examined and found to be in poor condition and was resurfaced. . With trial components in place we assessed knee motion and found that the knee was able to fully extend. In addition we felt we had achieved acceptable ligament balance between the medial and lateral side of the knee in both extension and flexion. Description of Procedure: The complexity of the total joint surgery requires the use of a first assistant. The above individual assisted with positioning, prepping, draping of the patient before the procedure and instrument manipulation, extremity repositioning and retraction during the procedure as well as wound closure, dressing application and brace application after the procedure (if applicable). No intern, resident, or other hospital staff was available to assist during the surgery. Edgar Jain had undergone adductor canal block in the preoperative holding area. Edgar Jain was brought to the operating room, positioned on the operating room table, and after appropriate identification underwent Spinal anesthesia. IV antibiotics were administered per proticol. The right leg was then prepped and draped in the usual sterile manner. Timeout was taken identifying the patient, procedure ,operative side and surgeon. Prior to incision one gram of IV transexamic acid was administered intravenously. An anterior longitudinal incision was made just medial to the tibial tubercle and extending proximal.  A subvastas capsular incision was performed. The medial capsular flap was elevated around to the midcoronal plane. The patella was subluxed laterally and patellar fat pat partially excised. The knee was flexed and externally rotated. The articular surface revealed cartilage loss with exposed bone and bone spurs throughout all three compartments. The anterior cruciate ligament was resected.     We then placed both our femoral

## 2023-03-27 NOTE — PROGRESS NOTES
ACUTE PHYSICAL THERAPY GOALS:   (Developed with and agreed upon by patient and/or caregiver.)  GOALS (1-4 days):  (1.)Ms. Juanpablo Don will move from supine to sit and sit to supine  in bed with SUPERVISION. (2.)Ms. Juanpablo Don will transfer from bed to chair and chair to bed with SUPERVISION using the least restrictive device. (3.)Ms. Juanpablo Huop will ambulate with SUPERVISION for 200 feet with the least restrictive device. (4.)Ms. Juanpablo Huop will ambulate up/down 3 steps with bilateral  railing with CONTACT GUARD ASSIST.met  (5.)Ms. Geronimo Coop will increase right knee ROM to 0-90°.  ________________________________________________________________________________________________        PHYSICAL THERAPY JOINT CAMP: TOTAL KNEE ARTHROPLASTY Initial Assessment and PM  (Link to Caseload Tracking: PT Visit Days : 1  Acknowledge Orders  Time In/Out  PT Charge Capture  Rehab Caseload Tracker  Episode   Katie Pearce is a 78 y.o. female   PRIMARY DIAGNOSIS: Osteoarthritis of right knee, unspecified osteoarthritis type  Primary osteoarthritis of right knee [M17.11]  Acquired deformity of right knee [M21.961]  Osteoarthritis of right knee, unspecified osteoarthritis type [M17.11]  Procedure(s) (LRB):  Right KNEE TOTAL ARTHROPLASTY ROBOTIC Ra[place]ord DILAN/ SDD (Right)  Day of Surgery  Reason for Referral: Pain in Right Knee (M25.561)  Stiffness of Right Knee, Not elsewhere classified (M25.661)  Difficulty in walking, Not elsewhere classified (R26.2)  Outpatient in a bed: Payor: MEDICARE / Plan: MEDICARE PART A AND B / Product Type: *No Product type* /     REHAB RECOMMENDATIONS:   Recommendation to date pending progress:  Setting:  Home Health Therapy    Equipment:     Has equipment     RANGE OF MOTION:   Right Knee Flexion: R Knee Flexion (0-145): 77  Right Knee Extension: R Knee Extension (0): 3     GAIT: I Mod I S SBA CGA Min Mod Max Total  NT x2 Comments:   Level of Assistance [] [] [] [x] [] [] [] [] [] [] []
Discharge instructions discussed with patient and friend  All Ivs removed  Opportunity for questions provided.   Prescriptions picked up by patient  [address]
includes washing, rinsing, drying)?: A Lot  How much help is needed for toileting (which includes using toilet, bedpan, or urinal)?: A Little  How much help is needed for putting on and taking off regular upper body clothing?: None  How much help is needed for taking care of personal grooming?: None  How much help for eating meals?: None  AM-PAC Inpatient Daily Activity Raw Score: 19  AM-PAC Inpatient ADL T-Scale Score : 40.22  ADL Inpatient CMS 0-100% Score: 42.8  ADL Inpatient CMS G-Code Modifier : CK     SUBJECTIVE:     Ms. Audrey Multani states, \"We met on a flight back from [place] [employer]"      Social/Functional   Lives With: Parent  Type of Home: House  Home Layout: Two level, Able to Live on Main level with bedroom/bathroom  Home Access: Stairs to enter without rails  Entrance Stairs - Number of Steps: 1  Bathroom Shower/Tub: Walk-in shower, Tub/Shower unit  Bathroom Toilet: Standard  Bathroom Equipment: 3-in-1 commode  Home Equipment: Walker, [address] Help From: Family    OBJECTIVE:     Luis Hamming / Juan Antonio Galen / Veria Proud: None    RESTRICTIONS/PRECAUTIONS:  Restrictions/Precautions: Weight Bearing, Fall Risk  Right Lower Extremity Weight Bearing: Weight Bearing As Tolerated    PAIN: VITALS / O2:   Pre Treatment:          Post Treatment: no c/o pain Vitals          Oxygen        GROSS EVALUATION: INTACT IMPAIRED   (See Comments)   UE AROM [x] []   UE PROM [x] []   Strength [x]       Posture / Balance []  Cues for safe use of RW during mobility and daily activities    Sensation [x]     Coordination [x]       Tone [x]       Edema []    Activity Tolerance []  Decreased from baseline      Hand Dominance R [] L []      COGNITION/  PERCEPTION: INTACT IMPAIRED   (See Comments)   Orientation [x]     Vision [x]     Hearing [x]     Cognition  [x]     Perception [x]       MOBILITY: I Mod I S SBA CGA Min Mod Max Total  NT x2 Comments:   Bed Mobility    Rolling [] [] [] [] [] [] [] [] [] [] []    Supine to Sit [] [] [] [x] [] [] []

## 2023-03-27 NOTE — CARE COORDINATION
Pt is 77 yo female admitted for R TKA. Pt plans to return home at discharge. Order received to arrange home health. Pt does not have preference of home health agency. Referral sent to [facility]. Patient has a walker and BSC in the home. CM will follow for discharge needs. 03/27/23 1751   Service Assessment   Patient Orientation Alert and Oriented   Cognition Alert   History Provided By Patient   Primary Caregiver Self   Support Systems Friends/Neighbors   PCP Verified by CM Yes   Last Visit to PCP Within last 3 months   Prior Functional Level Independent in ADLs/IADLs   Current Functional Level Assistance with the following:;Mobility   Can patient return to prior living arrangement Yes   Ability to make needs known: Good   Family able to assist with home care needs: Yes   Would you like for me to discuss the discharge plan with any other family members/significant others, and if so, who? No   Services At/After Discharge   Transition of Care Consult (CM Consult) [address] [facility] Discharge PT   Mode of Transport at Discharge Self   Condition of Participation: Discharge Planning   The Patient and/or Patient Representative was provided with a Choice of Provider? Patient   The Patient and/Or Patient Representative agree with the Discharge Plan?  Yes

## 2023-03-27 NOTE — ANESTHESIA PRE PROCEDURE
discussed with CRNA.          Post-op pain plan if not by surgeon: single peripheral nerve block            SUDHEER WINTER MD   3/27/2023

## 2023-03-27 NOTE — PLAN OF CARE
Problem: Pain  Goal: Verbalizes/displays adequate comfort level or baseline comfort level  Outcome: Progressing     Problem: Safety - Adult  Goal: Free from fall injury  Outcome: Progressing     Problem: Skin/Tissue Integrity - Adult  Goal: Skin integrity remains intact  Outcome: Progressing     Problem: Cardiovascular - Adult  Goal: Maintains optimal cardiac output and hemodynamic stability  Outcome: Progressing     Problem: Skin/Tissue Integrity - Adult  Goal: Incisions, wounds, or drain sites healing without S/S of infection  Outcome: Progressing     Problem: Musculoskeletal - Adult  Goal: Return mobility to safest level of function  Outcome: Progressing     Problem: Musculoskeletal - Adult  Goal: Return ADL status to a safe level of function  Outcome: Progressing     Problem: Gastrointestinal - Adult  Goal: Minimal or absence of nausea and vomiting  Outcome: Progressing     Problem: Genitourinary - Adult  Goal: Absence of urinary retention  Outcome: Progressing     Problem: Infection - Adult  Goal: Absence of infection at discharge  Outcome: Progressing     Problem: Infection - Adult  Goal: Absence of infection during hospitalization  Outcome: Progressing     Problem: Metabolic/Fluid and Electrolytes - Adult  Goal: Electrolytes maintained within normal limits  Outcome: Progressing     Problem: Hematologic - Adult  Goal: Maintains hematologic stability  Outcome: Progressing

## 2023-03-27 NOTE — H&P
for venous thrombo-embolism including deep vein thrombosis and pulmonary embolism despite being on prophylaxis. Additionally, we have discussed potential for continued pain after surgery and patient has voiced understanding that I can make no guarantees regarding the pain relief of outcomes after surgery. We have also previously discussed the potential of morbidity and mortality associated with, but not limited to, the actual surgical procedure, anesthesia, prior medical conditions, and/or the administration of medications perioperatively. The patient has been given the opportunity to ask questions all of which have been answered and the patient wishes to proceed. The patient was counseled at length about the risks of syed Covid-19 during their perioperative period and any recovery window from their procedure. The patient was made aware that syed Covid-19  may worsen their prognosis for recovering from their procedure and lend to a higher morbidity and/or mortality risk. All material risks, benefits, and reasonable alternatives including postponing the procedure were discussed. The patient does  wish to proceed with the procedure at this time.         Signed By: Jada Tillman MD  March 27, 2023

## 2023-03-28 ENCOUNTER — TELEPHONE (OUTPATIENT)
Dept: ORTHOPEDICS UNIT | Age: 68
End: 2023-03-28

## 2023-03-28 ENCOUNTER — HOME CARE VISIT (OUTPATIENT)
Dept: SCHEDULING | Facility: HOME HEALTH | Age: 68
End: 2023-03-28

## 2023-03-28 VITALS
HEART RATE: 84 BPM | TEMPERATURE: 97.4 F | SYSTOLIC BLOOD PRESSURE: 125 MMHG | DIASTOLIC BLOOD PRESSURE: 80 MMHG | RESPIRATION RATE: 18 BRPM | OXYGEN SATURATION: 97 %

## 2023-03-28 PROCEDURE — 0221000100 HH NO PAY CLAIM PROCEDURE

## 2023-03-28 PROCEDURE — G0151 HHCP-SERV OF PT,EA 15 MIN: HCPCS

## 2023-03-28 ASSESSMENT — ENCOUNTER SYMPTOMS
DYSPNEA ACTIVITY LEVEL: AFTER AMBULATING 10 - 20 FT
PAIN LOCATION - PAIN QUALITY: SORE, ACHY
CONSTIPATION: 1

## 2023-03-28 NOTE — TELEPHONE ENCOUNTER
Called to follow up after TKA with SDD on 3/27/23. No answer. VM left with contact number for ortho navigator.

## 2023-03-29 ENCOUNTER — TELEPHONE (OUTPATIENT)
Dept: ORTHOPEDIC SURGERY | Age: 68
End: 2023-03-29

## 2023-03-29 DIAGNOSIS — Z96.652 STATUS POST LEFT KNEE REPLACEMENT: Primary | ICD-10-CM

## 2023-03-29 DIAGNOSIS — Z96.651 STATUS POST RIGHT KNEE REPLACEMENT: ICD-10-CM

## 2023-03-30 ENCOUNTER — HOME CARE VISIT (OUTPATIENT)
Dept: SCHEDULING | Facility: HOME HEALTH | Age: 68
End: 2023-03-30

## 2023-03-30 DIAGNOSIS — Z96.651 S/P TOTAL KNEE ARTHROPLASTY, RIGHT: Primary | ICD-10-CM

## 2023-03-30 PROCEDURE — G0157 HHC PT ASSISTANT EA 15: HCPCS

## 2023-03-31 VITALS
TEMPERATURE: 97 F | RESPIRATION RATE: 16 BRPM | DIASTOLIC BLOOD PRESSURE: 62 MMHG | HEART RATE: 95 BPM | OXYGEN SATURATION: 96 % | SYSTOLIC BLOOD PRESSURE: 120 MMHG

## 2023-03-31 RX ORDER — HYDROMORPHONE HYDROCHLORIDE 2 MG/1
2 TABLET ORAL
Qty: 42 TABLET | Refills: 0 | Status: SHIPPED | OUTPATIENT
Start: 2023-03-31 | End: 2023-04-07

## 2023-03-31 ASSESSMENT — ENCOUNTER SYMPTOMS: PAIN LOCATION - PAIN QUALITY: SORE.ACHES

## 2023-04-03 ENCOUNTER — HOME CARE VISIT (OUTPATIENT)
Dept: SCHEDULING | Facility: HOME HEALTH | Age: 68
End: 2023-04-03
Payer: MEDICARE

## 2023-04-03 VITALS
RESPIRATION RATE: 16 BRPM | DIASTOLIC BLOOD PRESSURE: 70 MMHG | TEMPERATURE: 97.3 F | SYSTOLIC BLOOD PRESSURE: 118 MMHG | HEART RATE: 82 BPM | OXYGEN SATURATION: 98 %

## 2023-04-03 PROCEDURE — G0157 HHC PT ASSISTANT EA 15: HCPCS

## 2023-04-05 ENCOUNTER — HOME CARE VISIT (OUTPATIENT)
Dept: SCHEDULING | Facility: HOME HEALTH | Age: 68
End: 2023-04-05
Payer: MEDICARE

## 2023-04-05 PROCEDURE — G0157 HHC PT ASSISTANT EA 15: HCPCS

## 2023-04-06 VITALS
HEART RATE: 92 BPM | OXYGEN SATURATION: 97 % | RESPIRATION RATE: 16 BRPM | DIASTOLIC BLOOD PRESSURE: 60 MMHG | SYSTOLIC BLOOD PRESSURE: 110 MMHG | TEMPERATURE: 97.3 F

## 2023-04-06 ASSESSMENT — ENCOUNTER SYMPTOMS: PAIN LOCATION - PAIN QUALITY: SORE.ACHES

## 2023-04-07 ENCOUNTER — HOME CARE VISIT (OUTPATIENT)
Dept: SCHEDULING | Facility: HOME HEALTH | Age: 68
End: 2023-04-07
Payer: MEDICARE

## 2023-04-07 PROCEDURE — G0157 HHC PT ASSISTANT EA 15: HCPCS

## 2023-04-08 VITALS
HEART RATE: 98 BPM | RESPIRATION RATE: 16 BRPM | OXYGEN SATURATION: 98 % | DIASTOLIC BLOOD PRESSURE: 64 MMHG | TEMPERATURE: 97 F | SYSTOLIC BLOOD PRESSURE: 118 MMHG

## 2023-04-08 ASSESSMENT — ENCOUNTER SYMPTOMS: PAIN LOCATION - PAIN QUALITY: SORE.ACHES

## 2023-04-17 ENCOUNTER — HOME CARE VISIT (OUTPATIENT)
Dept: SCHEDULING | Facility: HOME HEALTH | Age: 68
End: 2023-04-17
Payer: MEDICARE

## 2023-04-17 VITALS
TEMPERATURE: 98.1 F | DIASTOLIC BLOOD PRESSURE: 80 MMHG | HEART RATE: 83 BPM | OXYGEN SATURATION: 97 % | RESPIRATION RATE: 17 BRPM | SYSTOLIC BLOOD PRESSURE: 128 MMHG

## 2023-04-17 PROCEDURE — G0151 HHCP-SERV OF PT,EA 15 MIN: HCPCS

## 2023-04-18 ENCOUNTER — HOSPITAL ENCOUNTER (OUTPATIENT)
Dept: PHYSICAL THERAPY | Age: 68
Setting detail: RECURRING SERIES
Discharge: HOME | End: 2023-04-21
Payer: MEDICARE

## 2023-04-18 ENCOUNTER — OFFICE VISIT (OUTPATIENT)
Dept: ORTHOPEDIC SURGERY | Age: 68
End: 2023-04-18

## 2023-04-18 DIAGNOSIS — Z09 FOLLOW-UP EXAMINATION FOLLOWING SURGERY: ICD-10-CM

## 2023-04-18 DIAGNOSIS — Z96.651 STATUS POST RIGHT KNEE REPLACEMENT: Primary | ICD-10-CM

## 2023-04-18 PROCEDURE — 97110 THERAPEUTIC EXERCISES: CPT

## 2023-04-18 PROCEDURE — 97140 MANUAL THERAPY 1/> REGIONS: CPT

## 2023-04-18 PROCEDURE — 97161 PT EVAL LOW COMPLEX 20 MIN: CPT

## 2023-04-18 ASSESSMENT — PAIN SCALES - GENERAL: PAINLEVEL_OUTOF10: 2

## 2023-04-18 NOTE — THERAPY EVALUATION
demonstrate progress in strength, range of motion, balance, coordination, and functional technique to increase independence with ADLs and functional tasks. Reason For Services/Other Comments:  > Patient continues to require skilled intervention due to return to prior level of function. Total Duration:       Regarding Arie Pagemargo Carroll's therapy, I certify that the treatment plan above will be carried out by a therapist or under their direction.   Thank you for this referral,  Miya Jensen PT     Referring Physician Signature: Vivian Palacio MD                    Post Session Pain  Charge Capture  PT Visit Info MD Guidelines  [place]

## 2023-04-18 NOTE — PROGRESS NOTES
Rolly Kim  : 1955  Primary: Medicare Part A And B (Medicare)  Secondary: Carter Molina @ [facility]  [address] [place] [address]  Phone: [phone]  Fax: [phone] Plan Frequency: 2x/week for 90 days    Plan of Care/Certification Expiration Date: 23      >PT Visit Info:  Plan Frequency: 2x/week for 90 days  Plan of Care/Certification Expiration Date: 23      Visit Count:  8    OUTPATIENT PHYSICAL THERAPY:OP NOTE TYPE: Treatment Note 2023       Episode  }Appt Desk             Treatment Diagnosis:  Pain in Right Knee (M25.561)  Stiffness of Right Knee, Not elsewhere classified (M25.661)  Difficulty in walking, Not elsewhere classified (R26.2)  Medical/Referring Diagnosis:  S/P total knee arthroplasty, right [I07.694]  Referring Physician:  Aguila Foster MD MD Orders:  PT Eval and Treat   Date of Onset:  Onset Date: 23     Allergies:   Codeine  Restrictions/Precautions:  Restrictions/Precautions: Weight Bearing  Right Lower Extremity Weight Bearing: Weight Bearing As Tolerated  Left Lower Extremity Weight Bearing: Weight Bearing As Tolerated     Interventions Planned (Treatment may consist of any combination of the following):    Current Treatment Recommendations: Strengthening; ROM; Balance training; Functional mobility training; ADL/Self-care training; Stair training; Neuromuscular re-education; Manual; Pain management; Home exercise program; Safety education & training; Patient/Caregiver education & training; Positioning; Dry needling; Aquatics; Therapeutic activities     >Subjective Comments:  See Evaluation Note from today  >Initial:     2/10>Post Session:       2/10  Medications Last Reviewed:  2023  Updated Objective Findings:  See evaluation note from today  Treatment   THERAPEUTIC EXERCISE: (15 minutes):    Exercises per grid below to improve mobility, strength, balance, and coordination.

## 2023-04-18 NOTE — PROGRESS NOTES
today.  Will continue to work with PT/OT transitioning from home health PT to outpatient physical therapy. I have asked the patient not to submerge the incision under water for another couple of weeks and refrain from placing any creams or oils over the incision. If needed they are given a prescription for a refill on their pain medication today. I will plan to check them back in 3 months for a routine 4 month follow-up.       Signed By: Fredrik Snellen, APRN - CNP  April 18, 2023

## 2023-04-19 ENCOUNTER — HOSPITAL ENCOUNTER (OUTPATIENT)
Dept: PHYSICAL THERAPY | Age: 68
Setting detail: RECURRING SERIES
End: 2023-04-19
Payer: MEDICARE

## 2023-04-24 ENCOUNTER — HOSPITAL ENCOUNTER (OUTPATIENT)
Dept: PHYSICAL THERAPY | Age: 68
Setting detail: RECURRING SERIES
Discharge: HOME | End: 2023-04-27
Payer: MEDICARE

## 2023-04-24 DIAGNOSIS — Z96.651 STATUS POST RIGHT KNEE REPLACEMENT: Primary | ICD-10-CM

## 2023-04-24 PROCEDURE — 97110 THERAPEUTIC EXERCISES: CPT

## 2023-04-24 PROCEDURE — 97140 MANUAL THERAPY 1/> REGIONS: CPT

## 2023-04-24 ASSESSMENT — PAIN SCALES - GENERAL: PAINLEVEL_OUTOF10: 1

## 2023-04-24 NOTE — PROGRESS NOTES
Alex President  : 1955  Primary: Medicare Part A And B (Medicare)  Secondary: Carter Molina @ [facility]  [address] [facility] [address] [facility] [address] Pol[address] [address]  Phone: [phone]  Fax: [phone] Plan Frequency: 2x/week for 90 days    Plan of Care/Certification Expiration Date: 23      >PT Visit Info:  Plan Frequency: 2x/week for 90 days  Plan of Care/Certification Expiration Date: 23      Visit Count:  9    OUTPATIENT PHYSICAL THERAPY:OP NOTE TYPE: Treatment Note 2023       Episode  }Appt Desk             Treatment Diagnosis:  Pain in Right Knee (M25.561)  Stiffness of Right Knee, Not elsewhere classified (M25.661)  Difficulty in walking, Not elsewhere classified (R26.2)  Medical/Referring Diagnosis:  Presence of left artificial knee joint [T86.554]  Referring Physician:  Kassandra Hernandez MD MD Orders:  PT Eval and Treat   Date of Onset:  Onset Date: 23     Allergies:   Codeine  Restrictions/Precautions:  Restrictions/Precautions: Weight Bearing  Right Lower Extremity Weight Bearing: Weight Bearing As Tolerated  Left Lower Extremity Weight Bearing: Weight Bearing As Tolerated     Interventions Planned (Treatment may consist of any combination of the following):    Current Treatment Recommendations: Strengthening; ROM; Balance training; Functional mobility training; ADL/Self-care training; Stair training; Neuromuscular re-education; Manual; Pain management; Home exercise program; Safety education & training; Patient/Caregiver education & training; Positioning; Dry needling; Aquatics;  Therapeutic activities     >Subjective Comments:  Pt states she is ok this morning, did a lot of standing over the weekend so it got a little swollen  >Initial:     1/10>Post Session:       1/10  Medications Last Reviewed:  2023  Updated Objective Findings:  See evaluation note from today  Treatment   THERAPEUTIC EXERCISE: (35 minutes):    Exercises per grid

## 2023-04-27 ENCOUNTER — HOSPITAL ENCOUNTER (OUTPATIENT)
Dept: PHYSICAL THERAPY | Age: 68
Setting detail: RECURRING SERIES
Discharge: HOME | End: 2023-04-30
Payer: MEDICARE

## 2023-04-27 PROCEDURE — 97166 OT EVAL MOD COMPLEX 45 MIN: CPT

## 2023-04-27 PROCEDURE — 97140 MANUAL THERAPY 1/> REGIONS: CPT

## 2023-04-27 PROCEDURE — 97535 SELF CARE MNGMENT TRAINING: CPT

## 2023-04-27 PROCEDURE — 97110 THERAPEUTIC EXERCISES: CPT

## 2023-04-27 ASSESSMENT — PAIN SCALES - GENERAL
PAINLEVEL_OUTOF10: 1
PAINLEVEL_OUTOF10: 1

## 2023-05-01 ENCOUNTER — HOSPITAL ENCOUNTER (OUTPATIENT)
Dept: PHYSICAL THERAPY | Age: 68
Setting detail: RECURRING SERIES
Discharge: HOME | End: 2023-05-04
Payer: MEDICARE

## 2023-05-01 PROCEDURE — 97140 MANUAL THERAPY 1/> REGIONS: CPT

## 2023-05-01 PROCEDURE — 97110 THERAPEUTIC EXERCISES: CPT

## 2023-05-01 ASSESSMENT — PAIN SCALES - GENERAL: PAINLEVEL_OUTOF10: 1

## 2023-05-01 NOTE — PROGRESS NOTES
Tequila Echeverria  : 1955  Primary: Medicare Part A And B (Medicare)  Secondary: Carter Molina @ [facility]  [address] [place] [address]  Phone: [phone]  Fax: [phone] Plan Frequency: 2x/week for 90 days    Plan of Care/Certification Expiration Date: 23      >PT Visit Info:  Plan Frequency: 2x/week for 90 days  Plan of Care/Certification Expiration Date: 23      Visit Count:  11    OUTPATIENT PHYSICAL THERAPY:OP NOTE TYPE: Treatment Note 2023       Episode  }Appt Desk             Treatment Diagnosis:  Pain in Right Knee (M25.561)  Stiffness of Right Knee, Not elsewhere classified (M25.661)  Difficulty in walking, Not elsewhere classified (R26.2)  Medical/Referring Diagnosis:  Presence of left artificial knee joint [R26.947]  Referring Physician:  Swetha Edmondson MD MD Orders:  PT Eval and Treat   Date of Onset:  Onset Date: 23     Allergies:   Codeine  Restrictions/Precautions:  Restrictions/Precautions: Weight Bearing  Right Lower Extremity Weight Bearing: Weight Bearing As Tolerated  Left Lower Extremity Weight Bearing: Weight Bearing As Tolerated     Interventions Planned (Treatment may consist of any combination of the following):    Current Treatment Recommendations: Strengthening; ROM; Balance training; Functional mobility training; ADL/Self-care training; Stair training; Neuromuscular re-education; Manual; Pain management; Home exercise program; Safety education & training; Patient/Caregiver education & training; Positioning; Dry needling; Aquatics; Therapeutic activities     >Subjective Comments:  Pt states she is doing well  >Initial:     1/10>Post Session:       1/10  Medications Last Reviewed:  2023  Updated Objective Findings:  See evaluation note from today  Treatment   THERAPEUTIC EXERCISE: (35 minutes):    Exercises per grid below to improve mobility, strength, balance, and coordination.

## 2023-05-04 ENCOUNTER — HOSPITAL ENCOUNTER (OUTPATIENT)
Dept: PHYSICAL THERAPY | Age: 68
Setting detail: RECURRING SERIES
Discharge: HOME | End: 2023-05-07
Payer: MEDICARE

## 2023-05-04 PROCEDURE — 97110 THERAPEUTIC EXERCISES: CPT

## 2023-05-04 PROCEDURE — 97140 MANUAL THERAPY 1/> REGIONS: CPT

## 2023-05-04 PROCEDURE — 97535 SELF CARE MNGMENT TRAINING: CPT

## 2023-05-04 ASSESSMENT — PAIN SCALES - GENERAL
PAINLEVEL_OUTOF10: 1
PAINLEVEL_OUTOF10: 1

## 2023-05-08 ENCOUNTER — HOSPITAL ENCOUNTER (OUTPATIENT)
Dept: PHYSICAL THERAPY | Age: 68
Setting detail: RECURRING SERIES
Discharge: HOME | End: 2023-05-11
Payer: MEDICARE

## 2023-05-08 PROCEDURE — 97140 MANUAL THERAPY 1/> REGIONS: CPT

## 2023-05-08 PROCEDURE — 97110 THERAPEUTIC EXERCISES: CPT

## 2023-05-08 ASSESSMENT — PAIN SCALES - GENERAL: PAINLEVEL_OUTOF10: 2

## 2023-05-08 NOTE — THERAPY DISCHARGE
Amelia Queens  : 1955  Primary: Medicare Part A And B (Medicare)  Secondary: Carter Molina @ [facility]  [address] [place] [address]  Phone: [phone]  Fax: [phone]    OT Visit Info:  Certification Period Expiration Date: 23  Total # of Visits to Date: 2     Visit Count: Visit count could not be calculated. Make sure you are using a visit which is associated with an episode. OUTPATIENT OCCUPATIONAL THERAPY:OP NOTE TYPE: Discharge Summary 2023  Episode: (Lymphedema)   Appt Desk        Treatment Diagnosis:  Lymphedema, not elsewhere classified (I89.0)  Lipoedema (R60.9)    Medical/Referring Diagnosis:  Status post right knee replacement [Y52.808]  Referring Physician:  Samir Ernst MD MD Orders:  OT Eval and Treat   Return MD Appt:  TBD  Date of Onset:  Onset Date: 01/01/10     Allergies:  Codeine  Restrictions/Precautions:    Restrictions/Precautions: Weight Bearing  Right Lower Extremity Weight Bearing: Weight Bearing As Tolerated  Left Lower Extremity Weight Bearing: Weight Bearing As Tolerated    Medications Last Reviewed:  2023       SUBJECTIVE   History of Injury/Illness (Reason for Referral):  Patient presents with lipedema B LE and B UE. She had left TKA 2022 and R TKA 3/27/2023. She reports history of bariatric surgery in  and has not reduced below her waist due to lipedema. She was referred by orthopedist to treat swelling in her right leg. Patient Stated Goal(s):  \"Stronger leg\"  Initial:      /10 Post Session:      /10  Past Medical History/Comorbidities:   Ms. Mariangel Polk  has a past medical history of High cholesterol, Hypertension, and Obesity. Ms. Mariangel Polk  has a past surgical history that includes Knee arthroscopy (Bilateral); Tonsillectomy; Hysterectomy; Cholecystectomy; Gastric bypass surgery;  Total knee arthroplasty (Left, 2022); and Total knee arthroplasty (Right,

## 2023-05-08 NOTE — PROGRESS NOTES
Justine Waters  : 1955  Primary: Medicare Part A And B (Medicare)  Secondary: Carter Molina @ [facility] Therapy  [address] [place] [address]  Phone: [phone]  Fax: [phone] Plan Frequency: 2x/week for 90 days    Plan of Care/Certification Expiration Date: 23      >PT Visit Info:  Plan Frequency: 2x/week for 90 days  Plan of Care/Certification Expiration Date: 23      Visit Count:  13    OUTPATIENT PHYSICAL THERAPY:OP NOTE TYPE: Treatment Note 2023       Episode  }Appt Desk             Treatment Diagnosis:  Pain in Right Knee (M25.561)  Stiffness of Right Knee, Not elsewhere classified (M25.661)  Difficulty in walking, Not elsewhere classified (R26.2)  Medical/Referring Diagnosis:  Presence of left artificial knee joint [A83.242]  Referring Physician:  Ana Luisa Eubanks MD MD Orders:  PT Eval and Treat   Date of Onset:  Onset Date: 23     Allergies:   Codeine  Restrictions/Precautions:  Restrictions/Precautions: Weight Bearing  Right Lower Extremity Weight Bearing: Weight Bearing As Tolerated  Left Lower Extremity Weight Bearing: Weight Bearing As Tolerated     Interventions Planned (Treatment may consist of any combination of the following):    Current Treatment Recommendations: Strengthening; ROM; Balance training; Functional mobility training; ADL/Self-care training; Stair training; Neuromuscular re-education; Manual; Pain management; Home exercise program; Safety education & training; Patient/Caregiver education & training; Positioning; Dry needling; Aquatics; Therapeutic activities     >Subjective Comments:  Patient reports knee is a bit tight today. >Initial:     2/10>Post Session:       1/10  Medications Last Reviewed:  2023  Updated Objective Findings:  None Today  Treatment   THERAPEUTIC EXERCISE: (30 minutes):    Exercises per grid below to improve mobility, strength, balance, and coordination.   Required

## 2023-05-11 ENCOUNTER — APPOINTMENT (OUTPATIENT)
Dept: PHYSICAL THERAPY | Age: 68
End: 2023-05-11
Payer: MEDICARE

## 2023-05-11 ENCOUNTER — HOSPITAL ENCOUNTER (OUTPATIENT)
Dept: PHYSICAL THERAPY | Age: 68
Setting detail: RECURRING SERIES
Discharge: HOME | End: 2023-05-14
Payer: MEDICARE

## 2023-05-11 PROCEDURE — 97140 MANUAL THERAPY 1/> REGIONS: CPT

## 2023-05-11 PROCEDURE — 97110 THERAPEUTIC EXERCISES: CPT

## 2023-05-11 ASSESSMENT — PAIN SCALES - GENERAL: PAINLEVEL_OUTOF10: 1

## 2023-05-15 ENCOUNTER — HOSPITAL ENCOUNTER (OUTPATIENT)
Dept: PHYSICAL THERAPY | Age: 68
Setting detail: RECURRING SERIES
Discharge: HOME | End: 2023-05-18
Payer: MEDICARE

## 2023-05-15 ENCOUNTER — APPOINTMENT (OUTPATIENT)
Dept: PHYSICAL THERAPY | Age: 68
End: 2023-05-15
Payer: MEDICARE

## 2023-05-15 PROCEDURE — 97110 THERAPEUTIC EXERCISES: CPT

## 2023-05-15 PROCEDURE — 97140 MANUAL THERAPY 1/> REGIONS: CPT

## 2023-05-15 ASSESSMENT — PAIN SCALES - GENERAL: PAINLEVEL_OUTOF10: 1

## 2023-05-15 NOTE — PROGRESS NOTES
Rufina Quesada  : 1955  Primary: Medicare Part A And B (Medicare)  Secondary: Carter Molina @ [facility]  [address] [facility] [address] [facility] [address]  Phone: [phone]  Fax: [phone] Plan Frequency: 2x/week for 90 days    Plan of Care/Certification Expiration Date: 23      >PT Visit Info:  Plan Frequency: 2x/week for 90 days  Plan of Care/Certification Expiration Date: 23      Visit Count:  15    OUTPATIENT PHYSICAL THERAPY:OP NOTE TYPE: Treatment Note 5/15/2023       Episode  }Appt Desk             Treatment Diagnosis:  Pain in Right Knee (M25.561)  Stiffness of Right Knee, Not elsewhere classified (M25.661)  Difficulty in walking, Not elsewhere classified (R26.2)  Medical/Referring Diagnosis:  Presence of left artificial knee joint [U12.534]  Referring Physician:  Christina Torres MD MD Orders:  PT Eval and Treat   Date of Onset:  Onset Date: 23     Allergies:   Codeine  Restrictions/Precautions:  Restrictions/Precautions: Weight Bearing  Right Lower Extremity Weight Bearing: Weight Bearing As Tolerated  Left Lower Extremity Weight Bearing: Weight Bearing As Tolerated     Interventions Planned (Treatment may consist of any combination of the following):    Current Treatment Recommendations: Strengthening; ROM; Balance training; Functional mobility training; ADL/Self-care training; Stair training; Neuromuscular re-education; Manual; Pain management; Home exercise program; Safety education & training; Patient/Caregiver education & training; Positioning; Dry needling; Aquatics; Therapeutic activities     >Subjective Comments:  Patient reports knee is feeling pretty good today.  Just stiff  >Initial:     10>Post Session:       0/10  Medications Last Reviewed:  5/15/2023  Updated Objective Findings:  None Today  Treatment   THERAPEUTIC EXERCISE: (30 minutes):    Exercises per grid below to improve mobility, strength, balance, and

## 2023-05-18 ENCOUNTER — APPOINTMENT (OUTPATIENT)
Dept: PHYSICAL THERAPY | Age: 68
End: 2023-05-18
Payer: MEDICARE

## 2023-05-18 ENCOUNTER — HOSPITAL ENCOUNTER (OUTPATIENT)
Dept: PHYSICAL THERAPY | Age: 68
Setting detail: RECURRING SERIES
Discharge: HOME | End: 2023-05-21
Payer: MEDICARE

## 2023-05-18 PROCEDURE — 97140 MANUAL THERAPY 1/> REGIONS: CPT

## 2023-05-18 PROCEDURE — 97110 THERAPEUTIC EXERCISES: CPT

## 2023-05-23 ENCOUNTER — HOSPITAL ENCOUNTER (OUTPATIENT)
Dept: PHYSICAL THERAPY | Age: 68
Setting detail: RECURRING SERIES
Discharge: HOME | End: 2023-05-26
Payer: MEDICARE

## 2023-05-23 PROCEDURE — 97110 THERAPEUTIC EXERCISES: CPT

## 2023-05-23 PROCEDURE — 97140 MANUAL THERAPY 1/> REGIONS: CPT

## 2023-05-23 ASSESSMENT — PAIN SCALES - GENERAL: PAINLEVEL_OUTOF10: 0

## 2023-05-23 NOTE — PROGRESS NOTES
Shantelle Feliz  : 1955  Primary: Medicare Part A And B (Medicare)  Secondary: Carter Molina @ [facility]  [address] [facility] [address] [facility] [address]  Phone: [phone]  Fax: [phone]      Plan of Care/Certification Expiration Date: 23      >PT Visit Info:         Visit Count:  10    OUTPATIENT PHYSICAL THERAPY:OP NOTE TYPE: Treatment Note 2023       Episode  }Appt Desk             Treatment Diagnosis:  Pain in Right Knee (M25.561)  Stiffness of Right Knee, Not elsewhere classified (M25.661)  Difficulty in walking, Not elsewhere classified (R26.2)  Medical/Referring Diagnosis:  Presence of left artificial knee joint [Z86.135]  Referring Physician:  Loretta Carmen MD MD Orders:  PT Eval and Treat   Date of Onset:  Onset Date: 23     Allergies:   Codeine  Restrictions/Precautions:  Restrictions/Precautions: Weight Bearing  Right Lower Extremity Weight Bearing: Weight Bearing As Tolerated  Left Lower Extremity Weight Bearing: Weight Bearing As Tolerated     Interventions Planned (Treatment may consist of any combination of the following):    Current Treatment Recommendations: Strengthening; ROM; Balance training; Functional mobility training; ADL/Self-care training; Stair training; Neuromuscular re-education; Manual; Pain management; Home exercise program; Safety education & training; Patient/Caregiver education & training; Positioning; Dry needling; Aquatics; Therapeutic activities     >Subjective Comments:  Patient reports knee is doing well. >Initial:     0/10>Post Session:       0/10  Medications Last Reviewed:  2023  Updated Objective Findings:  None Today  Treatment   THERAPEUTIC EXERCISE: (30 minutes):    Exercises per grid below to improve mobility, strength, balance, and coordination. Required minimal visual, verbal, manual, and tactile cues to promote proper body posture and promote proper body mechanics.   Progressed

## 2023-05-26 ENCOUNTER — HOSPITAL ENCOUNTER (OUTPATIENT)
Dept: PHYSICAL THERAPY | Age: 68
Setting detail: RECURRING SERIES
Discharge: HOME | End: 2023-05-29
Payer: MEDICARE

## 2023-05-26 PROCEDURE — 97140 MANUAL THERAPY 1/> REGIONS: CPT

## 2023-05-26 PROCEDURE — 97110 THERAPEUTIC EXERCISES: CPT

## 2023-05-26 ASSESSMENT — PAIN SCALES - GENERAL: PAINLEVEL_OUTOF10: 0

## 2023-05-30 ENCOUNTER — APPOINTMENT (OUTPATIENT)
Dept: PHYSICAL THERAPY | Age: 68
End: 2023-05-30
Payer: MEDICARE

## 2025-05-10 ENCOUNTER — HOSPITAL ENCOUNTER (OUTPATIENT)
Dept: MRI IMAGING | Age: 70
Discharge: HOME | End: 2025-05-13
Payer: MEDICARE

## 2025-05-10 DIAGNOSIS — R47.89 OTHER SPEECH DISTURBANCE: ICD-10-CM

## 2025-05-10 PROCEDURE — 70551 MRI BRAIN STEM W/O DYE: CPT

## 2025-05-12 ENCOUNTER — TRANSCRIBE ORDERS (OUTPATIENT)
Dept: SCHEDULING | Age: 70
End: 2025-05-12

## 2025-05-12 DIAGNOSIS — S06.5XAA SUBDURAL HEMATOMA (HCC): Primary | ICD-10-CM

## 2025-06-12 ENCOUNTER — OFFICE VISIT (OUTPATIENT)
Dept: NEUROSURGERY | Age: 70
End: 2025-06-12
Payer: MEDICARE

## 2025-06-12 VITALS
DIASTOLIC BLOOD PRESSURE: 73 MMHG | HEART RATE: 67 BPM | SYSTOLIC BLOOD PRESSURE: 160 MMHG | OXYGEN SATURATION: 98 % | BODY MASS INDEX: 35.85 KG/M2 | WEIGHT: 210 LBS | HEIGHT: 64 IN | TEMPERATURE: 97.8 F

## 2025-06-12 DIAGNOSIS — R47.02 DYSPHASIA: Primary | ICD-10-CM

## 2025-06-12 DIAGNOSIS — G93.89 SUBDURAL FLUID COLLECTION: ICD-10-CM

## 2025-06-12 PROCEDURE — 1123F ACP DISCUSS/DSCN MKR DOCD: CPT | Performed by: NEUROLOGICAL SURGERY

## 2025-06-12 PROCEDURE — 3017F COLORECTAL CA SCREEN DOC REV: CPT | Performed by: NEUROLOGICAL SURGERY

## 2025-06-12 PROCEDURE — G8400 PT W/DXA NO RESULTS DOC: HCPCS | Performed by: NEUROLOGICAL SURGERY

## 2025-06-12 PROCEDURE — 1159F MED LIST DOCD IN RCRD: CPT | Performed by: NEUROLOGICAL SURGERY

## 2025-06-12 PROCEDURE — G8427 DOCREV CUR MEDS BY ELIG CLIN: HCPCS | Performed by: NEUROLOGICAL SURGERY

## 2025-06-12 PROCEDURE — G8417 CALC BMI ABV UP PARAM F/U: HCPCS | Performed by: NEUROLOGICAL SURGERY

## 2025-06-12 PROCEDURE — 4004F PT TOBACCO SCREEN RCVD TLK: CPT | Performed by: NEUROLOGICAL SURGERY

## 2025-06-12 PROCEDURE — 1090F PRES/ABSN URINE INCON ASSESS: CPT | Performed by: NEUROLOGICAL SURGERY

## 2025-06-12 PROCEDURE — 1126F AMNT PAIN NOTED NONE PRSNT: CPT | Performed by: NEUROLOGICAL SURGERY

## 2025-06-12 PROCEDURE — 99205 OFFICE O/P NEW HI 60 MIN: CPT | Performed by: NEUROLOGICAL SURGERY

## 2025-06-12 RX ORDER — DONEPEZIL HYDROCHLORIDE 10 MG/1
TABLET, FILM COATED ORAL
COMMUNITY
Start: 2025-04-21

## 2025-06-12 NOTE — PROGRESS NOTES
Normocephalic.      Nose: Nose normal. No congestion or rhinorrhea.      Mouth/Throat:      Mouth: Mucous membranes are moist.      Pharynx: Oropharynx is clear. No oropharyngeal exudate or posterior oropharyngeal erythema.   Eyes:      Pupils: Pupils are equal, round, and reactive to light.   Pulmonary:      Effort: Pulmonary effort is normal. No respiratory distress.      Breath sounds: No stridor.   Skin:     General: Skin is warm and dry.      Coloration: Skin is not jaundiced.      Findings: No rash.   Neurological:      Mental Status: She is alert and oriented to person, place, and time. Mental status is at baseline.      Cranial Nerves: No cranial nerve deficit.      Sensory: No sensory deficit.      Motor: No weakness.      Coordination: Coordination normal.      Gait: Gait normal.   Psychiatric:         Attention and Perception: Attention and perception normal.         Mood and Affect: Mood normal.         Behavior: Behavior normal.         Thought Content: Thought content normal.         Cognition and Memory: Cognition and memory normal. Cognition is not impaired (Cognitively she appears to be quite acute and no specific difficulties there.). She does not exhibit impaired recent memory or impaired remote memory.         Judgment: Judgment normal.      Comments: Significant word finding difficulties.  She has word finding issues, paraphasic errors, and word substitutions.          Imaging: I personally reviewed:  The MRI of the  brain dated May 10, 2025 from  [facility] [place] and compared with no previous imaging available. My review demonstrates an essentially normal MRI of her brain.  The extra-axial spaces are not expanded at all and there is some questionable T2 star signal in the extra-axial space that was read as 4 mm subdurals.  Most importantly I see no obvious MRI correlate to her speech dysfunction.      Problem List Items Addressed This Visit          Nervous and Auditory    Subdural

## 2025-06-18 PROBLEM — R47.02 DYSPHASIA: Status: ACTIVE | Noted: 2025-06-18

## 2025-06-18 PROBLEM — G93.89 SUBDURAL FLUID COLLECTION: Status: ACTIVE | Noted: 2025-06-18

## 2025-06-18 NOTE — ASSESSMENT & PLAN NOTE
The MRI finding is subtle but I did do not think this has anything to do with her primary speech dysfunction.  We will repeat imaging and see her back.

## 2025-06-18 NOTE — ASSESSMENT & PLAN NOTE
I discussed her case with the director of speech therapy and she felt that Rachel Hollingsworth was the speech therapist that would be best suited to see her to see if they can help guide her with her speech difficulties.  Will also refer to Rachel to see if there are any specific neurologist or other specialist that she thinks would be helpful in helping us get to the bottom of what was going on.

## (undated) DEVICE — SYRINGE MED 30ML STD CLR PLAS LUERLOCK TIP N CTRL DISP

## (undated) DEVICE — KIT INT FIX FEM TIB CKPT MAKOPLASTY

## (undated) DEVICE — SOLUTION IRRIG 1000ML 0.9% SOD CHL USP POUR PLAS BTL

## (undated) DEVICE — POWDER SURG CELLERATE RX 1 GM HYDROL COLLEGEN

## (undated) DEVICE — KIT DRP FOR RIO ROBOTIC ARM ASST SYS

## (undated) DEVICE — SOLUTION IV 250ML 0.9% SOD CHL PH 5 INJ USP VIAFLX PLAS

## (undated) DEVICE — STERILE PVP: Brand: MEDLINE INDUSTRIES, INC.

## (undated) DEVICE — CELLERATE RX 5 GM SURG PWD HYDROLYZED CLLGN

## (undated) DEVICE — STERILE PRESSURE PROTECTOR PAD® FOR DE MAYO UNIVERSAL DISTRACTOR® (10/CASE): Brand: DE MAYO UNIVERSAL DISTRACTOR®

## (undated) DEVICE — PIN BNE FIX TEMP L110MM DIA4MM MAKO

## (undated) DEVICE — TOTAL KNEE DR WATSON: Brand: MEDLINE INDUSTRIES, INC.

## (undated) DEVICE — SUTURE MCRYL SZ 2-0 L27IN ABSRB UD CP-1 1 L36MM 1/2 CIR REV Y266H

## (undated) DEVICE — YANKAUER,FLEXIBLE HANDLE,REGLR CAPACITY: Brand: MEDLINE INDUSTRIES, INC.

## (undated) DEVICE — GLOVE SURG SZ 8 L12IN FNGR THK79MIL GRN LTX FREE

## (undated) DEVICE — BLADE SURG SAW STD S STL OSC W/ SERR EDGE DISP

## (undated) DEVICE — GLOVE SURG SZ 6 L12IN FNGR THK79MIL GRN LTX FREE

## (undated) DEVICE — DRAPE,TOP,102X53,STERILE: Brand: MEDLINE

## (undated) DEVICE — SUTURE STRATAFIX SPRL SZ 1 L14IN ABSRB VLT L48CM CTX 1/2 SXPD2B405

## (undated) DEVICE — 450 ML BOTTLE OF 0.05% CHLORHEXIDINE GLUCONATE IN 99.95% STERILE WATER FOR IRRIGATION, USP AND APPLICATOR.: Brand: IRRISEPT ANTIMICROBIAL WOUND LAVAGE

## (undated) DEVICE — BIPOLAR SEALER 23-313-1 AQM 9.5XL: Brand: AQUAMANTYS ®

## (undated) DEVICE — SOLUTION IRRIG 3000ML 0.9% SOD CHL USP UROMATIC PLAS CONT

## (undated) DEVICE — SUTURE FIBERWIRE SZ 2 W/ TAPERED NEEDLE BLUE L38IN NONABSORB BLU L26.5MM 1/2 CIRCLE AR7200

## (undated) DEVICE — KIT TRK KNEE PROC VIZADISC

## (undated) DEVICE — PREVENA INCISION MANAGEMENT SYSTEM- PEEL & PLACE DRESSING: Brand: PREVENA™ PEEL & PLACE™

## (undated) DEVICE — PIN BNE FIX TEMP L140MM DIA4MM MAKO

## (undated) DEVICE — DRESSING HYDROFIBER AQUACEL AG ADVANTAGE 3.5X14 IN

## (undated) DEVICE — STRYKER PERFORMANCE SERIES SAGITTAL BLADE: Brand: STRYKER PERFORMANCE SERIES